# Patient Record
Sex: MALE | Race: BLACK OR AFRICAN AMERICAN | Employment: FULL TIME | URBAN - METROPOLITAN AREA
[De-identification: names, ages, dates, MRNs, and addresses within clinical notes are randomized per-mention and may not be internally consistent; named-entity substitution may affect disease eponyms.]

---

## 2018-07-25 ENCOUNTER — APPOINTMENT (OUTPATIENT)
Dept: GENERAL RADIOLOGY | Age: 38
End: 2018-07-25
Attending: EMERGENCY MEDICINE
Payer: COMMERCIAL

## 2018-07-25 ENCOUNTER — HOSPITAL ENCOUNTER (EMERGENCY)
Age: 38
Discharge: HOME | End: 2018-07-26
Attending: EMERGENCY MEDICINE
Payer: COMMERCIAL

## 2018-07-25 VITALS
WEIGHT: 166.6 LBS | SYSTOLIC BLOOD PRESSURE: 171 MMHG | TEMPERATURE: 98 F | DIASTOLIC BLOOD PRESSURE: 113 MMHG | HEART RATE: 84 BPM | OXYGEN SATURATION: 100 % | RESPIRATION RATE: 18 BRPM | BODY MASS INDEX: 26.09 KG/M2

## 2018-07-25 DIAGNOSIS — S39.012A BACK STRAIN, INITIAL ENCOUNTER: Primary | ICD-10-CM

## 2018-07-25 PROCEDURE — 96372 THER/PROPH/DIAG INJ SC/IM: CPT

## 2018-07-25 PROCEDURE — 74011250636 HC RX REV CODE- 250/636: Performed by: EMERGENCY MEDICINE

## 2018-07-25 PROCEDURE — 72072 X-RAY EXAM THORAC SPINE 3VWS: CPT

## 2018-07-25 PROCEDURE — 81001 URINALYSIS AUTO W/SCOPE: CPT | Performed by: EMERGENCY MEDICINE

## 2018-07-25 PROCEDURE — 99283 EMERGENCY DEPT VISIT LOW MDM: CPT

## 2018-07-25 PROCEDURE — 72100 X-RAY EXAM L-S SPINE 2/3 VWS: CPT

## 2018-07-25 PROCEDURE — 74011250637 HC RX REV CODE- 250/637: Performed by: EMERGENCY MEDICINE

## 2018-07-25 RX ORDER — KETOROLAC TROMETHAMINE 30 MG/ML
60 INJECTION, SOLUTION INTRAMUSCULAR; INTRAVENOUS
Status: COMPLETED | OUTPATIENT
Start: 2018-07-25 | End: 2018-07-25

## 2018-07-25 RX ORDER — CYCLOBENZAPRINE HCL 10 MG
10 TABLET ORAL
Status: COMPLETED | OUTPATIENT
Start: 2018-07-25 | End: 2018-07-25

## 2018-07-25 RX ADMIN — KETOROLAC TROMETHAMINE 60 MG: 30 INJECTION, SOLUTION INTRAMUSCULAR; INTRAVENOUS at 23:10

## 2018-07-25 RX ADMIN — CYCLOBENZAPRINE HYDROCHLORIDE 10 MG: 10 TABLET, FILM COATED ORAL at 23:10

## 2018-07-25 NOTE — LETTER
Catarino. Manny 57 
[address] [facility] [id number] 
[phone] Work/School Note Date: 7/25/2018 To Whom It May concern: 
 
Gilbert Stahl was seen and treated today in the emergency room by the following provider(s): 
Attending Provider: Darleen Flaherty MD.   
 
Gilbert Stahl may return to work on Saturday 7/28/18. Sincerely, Darleen Flaherty MD

## 2018-07-26 LAB
APPEARANCE UR: CLEAR
BACTERIA URNS QL MICRO: NEGATIVE /HPF
BILIRUB UR QL: NEGATIVE
COLOR UR: ABNORMAL
EPITH CASTS URNS QL MICRO: ABNORMAL /LPF
GLUCOSE UR STRIP.AUTO-MCNC: NEGATIVE MG/DL
HGB UR QL STRIP: ABNORMAL
KETONES UR QL STRIP.AUTO: NEGATIVE MG/DL
LEUKOCYTE ESTERASE UR QL STRIP.AUTO: NEGATIVE
NITRITE UR QL STRIP.AUTO: NEGATIVE
PH UR STRIP: 6 [PH] (ref 5–8)
PROT UR STRIP-MCNC: NEGATIVE MG/DL
RBC #/AREA URNS HPF: ABNORMAL /HPF (ref 0–5)
SP GR UR REFRACTOMETRY: <1.005 (ref 1–1.03)
UA: UC IF INDICATED,UAUC: ABNORMAL
UROBILINOGEN UR QL STRIP.AUTO: 0.2 EU/DL (ref 0.2–1)
WBC URNS QL MICRO: ABNORMAL /HPF (ref 0–4)

## 2018-07-26 RX ORDER — METHYLPREDNISOLONE 4 MG/1
TABLET ORAL
Qty: 1 DOSE PACK | Refills: 0 | Status: SHIPPED | OUTPATIENT
Start: 2018-07-26 | End: 2022-03-11

## 2018-07-26 RX ORDER — NAPROXEN 500 MG/1
500 TABLET ORAL 2 TIMES DAILY WITH MEALS
Qty: 30 TAB | Refills: 0 | Status: SHIPPED | OUTPATIENT
Start: 2018-07-26 | End: 2022-03-11

## 2018-07-26 RX ORDER — METHOCARBAMOL 500 MG/1
500 TABLET, FILM COATED ORAL 3 TIMES DAILY
Qty: 20 TAB | Refills: 0 | Status: SHIPPED | OUTPATIENT
Start: 2018-07-26 | End: 2022-03-11

## 2018-07-26 NOTE — ED PROVIDER NOTES
HPI Comments: 30 y.o. male who presents ambulatory to the ED accompanied by significant other, with chief complaint of left sided back pain that began today while at work. 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 Was at work today when he felt the onset of pain in the left side of the mid-to-lower back, but there was no definite injury or trauma associated with the onset of his discomfort. Pt states that his pain has been constant and progressively worsening since onset. He reports exacerbation of the pain with movement and with positional changes. He took a dose of diclofenac at home without significant relief. Pt ranks his current level of discomfort in the left side of his back as an 8/10 in severity. He denies any history of similar pain in the past. Pt specifically denies fevers, chills, congestion, cough, shortness of breath, chest pain, abdominal pain, nausea, vomiting, diarrhea, constipation, difficulty urinating, dysuria, incontinence, neck pain, skin rash, headache, or extremity numbness or weakness. There are no other acute medical concerns at this time. Social hx: Positive for Tobacco use (current some day smoker); Positive for EtOH use (occasional); Negative for Illicit Drug Abuse    PCP: None    Note written by Josefina Nugent, as dictated by Mariano Humphrey MD 10:43 PM     The history is provided by the patient and a significant other. No  was used. Past Medical History:   Diagnosis Date    Contact dermatitis and other eczema, due to unspecified cause        History reviewed. No pertinent surgical history. Family History:   Problem Relation Age of Onset    Diabetes Mother        Social History     Social History    Marital status: SINGLE     Spouse name: N/A    Number of children: N/A    Years of education: N/A     Occupational History    Not on file.      Social History Main Topics  Smoking status: Current Some Day Smoker    Smokeless tobacco: Never Used    Alcohol use Yes    Drug use: No    Sexual activity: Yes     Partners: Female     Other Topics Concern    Not on file     Social History Narrative         ALLERGIES: Pcn [penicillins]    Review of Systems   Constitutional: Negative for chills and fever. HENT: Negative for congestion. Respiratory: Negative for cough and shortness of breath. Cardiovascular: Negative for chest pain. Gastrointestinal: Negative for abdominal pain, constipation, diarrhea, nausea and vomiting. Genitourinary: Negative for difficulty urinating and dysuria. Musculoskeletal: Positive for back pain (left sided, mid-to-lower). Negative for neck pain. Skin: Negative for rash. Neurological: Negative for weakness, numbness and headaches. All other systems reviewed and are negative. Vitals:    07/25/18 2134   BP: (!) 171/113   Pulse: 84   Resp: 18   Temp: 98 °F (36.7 °C)   SpO2: 100%   Weight: 75.6 kg (166 lb 9.6 oz)            Physical Exam   Nursing note and vitals reviewed. CONSTITUTIONAL: Well-appearing; well-nourished; in no apparent distress  HEAD: Normocephalic; atraumatic  EYES: PERRL; EOM intact; conjunctiva and sclera are clear bilaterally. ENT: No rhinorrhea; normal pharynx with no tonsillar hypertrophy; mucous membranes pink/moist, no erythema, no exudate. NECK: Supple; non-tender; no cervical lymphadenopathy  CARD: Normal S1, S2; no murmurs, rubs, or gallops. Regular rate and rhythm. RESP: Normal respiratory effort; breath sounds clear and equal bilaterally; no wheezes, rhonchi, or rales. ABD: Normal bowel sounds; non-distended; non-tender; no palpable organomegaly, no masses, no bruits. Back Exam: Normal inspection; no vertebral point tenderness, no CVA tenderness. Normal range of motion.   EXT: Normal ROM in all four extremities; non-tender to palpation; no swelling or deformity; distal pulses are normal, no edema.  SKIN: Warm; dry; no rash. NEURO:Alert and oriented x 3, coherent, VARSHA-XII grossly intact, sensory and motor are non-focal.        MDM  Number of Diagnoses or Management Options  Back strain, initial encounter:   Diagnosis management comments: Assessment: acute exacerbation of low back pain, rule out vertebral spine disease. The patient is hemodynamically stable      Plan: x-ray of the lumbar spine and sacrum/ analgesia/ sitting on exam/ Monitor and Reevaluate. Amount and/or Complexity of Data Reviewed  Clinical lab tests: ordered and reviewed  Tests in the radiology section of CPT®: ordered and reviewed  Tests in the medicine section of CPT®: reviewed and ordered  Discussion of test results with the performing providers: yes  Decide to obtain previous medical records or to obtain history from someone other than the patient: yes  Obtain history from someone other than the patient: yes  Review and summarize past medical records: yes  Discuss the patient with other providers: yes  Independent visualization of images, tracings, or specimens: yes          ED Course       Procedures    XRAY INTERPRETATION (ED MD)  Xray of Lumbar spine shows no fracture. No subluxation/dislocation. No bony abnormality. Donato Holstein, MD 10:44 PM      XRAY INTERPRETATION (ED MD)  Xray of Thoracic spine shows no fracture. No subluxation/dislocation. No bony abnormality. Donato Holstein, MD 10:44 PM    Progress Note:   Pt has been reexamined by Donato Holstein, MD. Pt is feeling much better. Symptoms have improved. All available results have been reviewed with pt and any available family. Pt understands sx, dx, and tx in ED. Care plan has been outlined and questions have been answered. Pt is ready to go home. Will send home on low back pain instructions. Prescription of Medrol Dosepak, Robaxin, and naproxen. Outpatient referral with PCP as needed. Written by Donato Holstein, MD,9:49 AM    .   .

## 2018-07-26 NOTE — DISCHARGE INSTRUCTIONS

## 2018-07-26 NOTE — ED NOTES
Bedside and Verbal shift change report given to Amelia Valdivia (oncoming nurse) by Mami Reyes (offgoing nurse). Report included the following information SBAR, ED Summary and MAR.

## 2022-01-31 ENCOUNTER — NURSE TRIAGE (OUTPATIENT)
Dept: OTHER | Facility: CLINIC | Age: 42
End: 2022-01-31

## 2022-01-31 NOTE — TELEPHONE ENCOUNTER
Received call from Nav Lyman at [facility] with [employer] Complaint; Patient with Red Flag Complaint requesting to establish care with Dr. Mignon Garcia: Caller states \"Hernia\"     Current Symptoms: Bump on scrotum for a few years. Denies urination issues. Starting to become painful    Onset: a few years ago; worsening    Associated Symptoms: NA    Pain Severity: 8/10; aching and sharp; intermittent    Temperature: denies fever    What has been tried: Alleve    LMP: NA Pregnant: NA    Recommended disposition: PCP within 24 hours. ED/UCC as backup plan    Care advice provided, patient verbalizes understanding; denies any other questions or concerns; instructed to call back for any new or worsening symptoms. Writer provided warm transfer to [facility] for appointment scheduling    Attention Provider: Thank you for allowing me to participate in the care of your patient. The patient was connected to triage in response to information provided to the [facility]. Please do not respond through this encounter as the response is not directed to a shared pool.       Reason for Disposition   [1] Pain comes and goes (intermittent) AND [2] present > 24 hours    Protocols used: SCROTAL PAIN-ADULT-

## 2022-03-11 ENCOUNTER — OFFICE VISIT (OUTPATIENT)
Dept: INTERNAL MEDICINE CLINIC | Age: 42
End: 2022-03-11
Payer: COMMERCIAL

## 2022-03-11 ENCOUNTER — HOSPITAL ENCOUNTER (OUTPATIENT)
Dept: GENERAL RADIOLOGY | Age: 42
Discharge: HOME | End: 2022-03-11
Payer: COMMERCIAL

## 2022-03-11 VITALS
OXYGEN SATURATION: 99 % | DIASTOLIC BLOOD PRESSURE: 76 MMHG | HEIGHT: 67 IN | TEMPERATURE: 98.4 F | HEART RATE: 91 BPM | BODY MASS INDEX: 26.68 KG/M2 | SYSTOLIC BLOOD PRESSURE: 135 MMHG | WEIGHT: 170 LBS

## 2022-03-11 DIAGNOSIS — M25.551 RIGHT HIP PAIN: ICD-10-CM

## 2022-03-11 DIAGNOSIS — Z00.00 ENCOUNTER FOR MEDICAL EXAMINATION TO ESTABLISH CARE: Primary | ICD-10-CM

## 2022-03-11 DIAGNOSIS — N48.89 EPIDERMOID CYST OF SKIN OF PENIS: ICD-10-CM

## 2022-03-11 DIAGNOSIS — Z11.59 NEED FOR HEPATITIS C SCREENING TEST: ICD-10-CM

## 2022-03-11 DIAGNOSIS — Z87.39 HISTORY OF GOUT: ICD-10-CM

## 2022-03-11 DIAGNOSIS — E55.9 VITAMIN D DEFICIENCY: ICD-10-CM

## 2022-03-11 PROCEDURE — 73502 X-RAY EXAM HIP UNI 2-3 VIEWS: CPT

## 2022-03-11 PROCEDURE — 99203 OFFICE O/P NEW LOW 30 MIN: CPT | Performed by: INTERNAL MEDICINE

## 2022-03-11 RX ORDER — CHOLECALCIFEROL (VITAMIN D3) 125 MCG
CAPSULE ORAL
COMMUNITY
End: 2022-04-22

## 2022-03-11 NOTE — PROGRESS NOTES
Called and notified patient. Showed degenerative changes and necrosis. Deanna Lombardi  referred to [facility]

## 2022-03-11 NOTE — PROGRESS NOTES
Health Maintenance Due   Topic Date Due    Hepatitis C Screening  Never done    Depression Screen  Never done    COVID-19 Vaccine (1) Never done    Pneumococcal 0-64 years (1 of 2 - PPSV23) Never done    DTaP/Tdap/Td series (1 - Tdap) Never done    Flu Vaccine (1) Never done       Chief Complaint   Patient presents with    New Patient    Hip Pain    Testicle Pain       1. Have you been to the ER, urgent care clinic since your last visit? Hospitalized since your last visit? Yes, Swollen toe ( Gout), urgent care, 12/2021    2. Have you seen or consulted any other health care providers outside of the [address] [place] [address] since your last visit? Include any pap smears or colon screening. No    3) Do you have an Advance Directive on file? no    4) Are you interested in receiving information on Advance Directives? NO      Patient is accompanied by self I have received verbal consent from Stephenie Reddy to discuss any/all medical information while they are present in the room.   3

## 2022-03-11 NOTE — PROGRESS NOTES
HISTORY OF PRESENT ILLNESS  Von Trujillo is a 46 y.o. male. Patient was seen to establish care. Reports that he was been seen in the ER or urgent care for what he was told was gout and/or tendinitis. Reports this has not happen in some time. Reports ongoing right hip pain for years now. Also had some lower back pain at one time too. Reports that the pain is achy. Can travel downward. Takes aleve and this does help. Has been having to take this often recently. No recent trauma. No falls. Notices that it happens more when sitting for long periods or doing manual work. Also describes an area to the penis that has been there for years. Not painful and has not changes after all these years. Non bothersome and no signs of infection. does not impede  symptoms. Visit Vitals  BP (!) 144/86 (BP 1 Location: Left upper arm, BP Patient Position: Sitting, BP Cuff Size: Adult)   Pulse 91   Temp 98.4 °F (36.9 °C) (Axillary)   Ht 5' 7\" (1.702 m)   Wt 170 lb (77.1 kg)   SpO2 99%   BMI 26.63 kg/m²     Past Medical History:   Diagnosis Date    Contact dermatitis and other eczema, due to unspecified cause      History reviewed. No pertinent surgical history. Family History   Problem Relation Age of Onset    Diabetes Mother     Hypertension Father     Hypertension Brother      Outpatient Encounter Medications as of 3/11/2022   Medication Sig Dispense Refill    naproxen sodium (Aleve) 220 mg cap Take  by mouth.  [DISCONTINUED] methylPREDNISolone (MEDROL, ODESSA,) 4 mg tablet Use as directed (Patient not taking: Reported on 3/11/2022) 1 Dose Pack 0    [DISCONTINUED] naproxen (NAPROSYN) 500 mg tablet Take 1 Tab by mouth two (2) times daily (with meals). (Patient not taking: Reported on 3/11/2022) 30 Tab 0    [DISCONTINUED] methocarbamol (ROBAXIN) 500 mg tablet Take 1 Tab by mouth three (3) times daily.  (Patient not taking: Reported on 3/11/2022) 20 Tab 0    [DISCONTINUED] hydrocortisone (WESTCORT) 0.2 % topical cream Apply  to affected area two (2) times a day. use thin layer (Patient not taking: Reported on 3/11/2022) 15 g 1    [DISCONTINUED] triamcinolone acetonide (KENALOG) 0.1 % topical cream Apply  to affected area two (2) times a day. use thin layer  (Patient not taking: Reported on 3/11/2022)       No facility-administered encounter medications on file as of 3/11/2022. HPI    Review of Systems   Constitutional: Negative. Respiratory: Negative. Cardiovascular: Negative. Gastrointestinal: Negative. Genitourinary: Negative for dysuria, flank pain and frequency. Area to penis    Musculoskeletal: Positive for back pain and joint pain. Neurological: Negative. Psychiatric/Behavioral: Negative. Physical Exam  Vitals and nursing note reviewed. Exam conducted with a chaperone present. Cardiovascular:      Rate and Rhythm: Normal rate and regular rhythm. Pulmonary:      Effort: Pulmonary effort is normal.      Breath sounds: Normal breath sounds. Abdominal:      Palpations: Abdomen is soft. Genitourinary:     Pubic Area: No rash. Penis: No erythema, tenderness or discharge. Comments: Pea size cyst. Fluid filled. No signs of inflammation   Musculoskeletal:      Comments: Pain when extended right leg   Neurological:      Mental Status: He is alert and oriented to person, place, and time. Psychiatric:         Behavior: Behavior normal.         ASSESSMENT and PLAN  Diagnoses and all orders for this visit:    1. Encounter for medical examination to establish care  -     CBC WITH AUTOMATED DIFF; Future  -     METABOLIC PANEL, COMPREHENSIVE; Future  -     LIPID PANEL; Future    2. History of gout    3. Right hip pain  -     XR HIP RT W OR WO PELV 2-3 VWS; Future    4. Vitamin D deficiency  -     VITAMIN D, 25 HYDROXY; Future    5. Need for hepatitis C screening test  -     HEPATITIS C AB; Future    6. Epidermoid cyst of skin of penis        -     Monitor.  Offered to see specialist     Follow-up and Dispositions    · Return in 1 year (on 3/11/2023), or if symptoms worsen or fail to improve.       lab results and schedule of future lab studies reviewed with patient  reviewed diet, exercise and weight control  cardiovascular risk and specific lipid/LDL goals reviewed  reviewed medications and side effects in detail

## 2022-03-12 LAB
25(OH)D3+25(OH)D2 SERPL-MCNC: 11.1 NG/ML (ref 30–100)
ALBUMIN SERPL-MCNC: 4.6 G/DL (ref 4–5)
ALBUMIN/GLOB SERPL: 1.5 {RATIO} (ref 1.2–2.2)
ALP SERPL-CCNC: 60 IU/L (ref 44–121)
ALT SERPL-CCNC: 19 IU/L (ref 0–44)
AST SERPL-CCNC: 20 IU/L (ref 0–40)
BASOPHILS # BLD AUTO: 0.1 X10E3/UL (ref 0–0.2)
BASOPHILS NFR BLD AUTO: 1 %
BILIRUB SERPL-MCNC: <0.2 MG/DL (ref 0–1.2)
BUN SERPL-MCNC: 17 MG/DL (ref 6–20)
BUN/CREAT SERPL: 14 (ref 9–20)
CALCIUM SERPL-MCNC: 9.8 MG/DL (ref 8.7–10.2)
CHLORIDE SERPL-SCNC: 104 MMOL/L (ref 96–106)
CHOLEST SERPL-MCNC: 178 MG/DL (ref 100–199)
CO2 SERPL-SCNC: 21 MMOL/L (ref 20–29)
CREAT SERPL-MCNC: 1.2 MG/DL (ref 0.76–1.27)
EGFR: 79 ML/MIN/1.73
EOSINOPHIL # BLD AUTO: 0.4 X10E3/UL (ref 0–0.4)
EOSINOPHIL NFR BLD AUTO: 6 %
ERYTHROCYTE [DISTWIDTH] IN BLOOD BY AUTOMATED COUNT: 19.3 % (ref 11.6–15.4)
GLOBULIN SER CALC-MCNC: 3.1 G/DL (ref 1.5–4.5)
GLUCOSE SERPL-MCNC: 102 MG/DL (ref 65–99)
HCT VFR BLD AUTO: 35.7 % (ref 37.5–51)
HCV AB S/CO SERPL IA: <0.1 S/CO RATIO (ref 0–0.9)
HDLC SERPL-MCNC: 36 MG/DL
HGB BLD-MCNC: 10 G/DL (ref 13–17.7)
IMM GRANULOCYTES # BLD AUTO: 0 X10E3/UL (ref 0–0.1)
IMM GRANULOCYTES NFR BLD AUTO: 0 %
IMP & REVIEW OF LAB RESULTS: NORMAL
INTERPRETATION: NORMAL
LDLC SERPL CALC-MCNC: 99 MG/DL (ref 0–99)
LYMPHOCYTES # BLD AUTO: 1.8 X10E3/UL (ref 0.7–3.1)
LYMPHOCYTES NFR BLD AUTO: 32 %
MCH RBC QN AUTO: 19.2 PG (ref 26.6–33)
MCHC RBC AUTO-ENTMCNC: 28 G/DL (ref 31.5–35.7)
MCV RBC AUTO: 68 FL (ref 79–97)
MONOCYTES # BLD AUTO: 0.6 X10E3/UL (ref 0.1–0.9)
MONOCYTES NFR BLD AUTO: 11 %
NEUTROPHILS # BLD AUTO: 2.8 X10E3/UL (ref 1.4–7)
NEUTROPHILS NFR BLD AUTO: 50 %
PLATELET # BLD AUTO: 325 X10E3/UL (ref 150–450)
POTASSIUM SERPL-SCNC: 4.2 MMOL/L (ref 3.5–5.2)
PROT SERPL-MCNC: 7.7 G/DL (ref 6–8.5)
RBC # BLD AUTO: 5.22 X10E6/UL (ref 4.14–5.8)
SODIUM SERPL-SCNC: 141 MMOL/L (ref 134–144)
TRIGL SERPL-MCNC: 252 MG/DL (ref 0–149)
VLDLC SERPL CALC-MCNC: 43 MG/DL (ref 5–40)
WBC # BLD AUTO: 5.6 X10E3/UL (ref 3.4–10.8)

## 2022-03-12 NOTE — PROGRESS NOTES
Hbg. Can we add an iron and ferritin please   Lipids are abnormal. Watch the saturated fats, red meats and processed foods   Vitamin d low. Will send in replacement for 12 weeks and then take OTC daily.  Please send in this for me as he does not have a pharmacy on file

## 2022-03-19 PROBLEM — Z87.39 HISTORY OF GOUT: Status: ACTIVE | Noted: 2022-03-11

## 2022-03-21 ENCOUNTER — OFFICE VISIT (OUTPATIENT)
Dept: ORTHOPEDIC SURGERY | Age: 42
End: 2022-03-21
Payer: COMMERCIAL

## 2022-03-21 VITALS — BODY MASS INDEX: 25.9 KG/M2 | HEIGHT: 67 IN | WEIGHT: 165 LBS

## 2022-03-21 DIAGNOSIS — M87.051 AVASCULAR NECROSIS OF BONE OF RIGHT HIP (HCC): Primary | ICD-10-CM

## 2022-03-21 PROCEDURE — 99205 OFFICE O/P NEW HI 60 MIN: CPT | Performed by: ORTHOPAEDIC SURGERY

## 2022-03-21 NOTE — PROGRESS NOTES
Marco Antonio Chauhan (: 1980) is a 46 y.o. male patient, here for evaluation of the following chief complaint(s):  Hip Pain (right hip)       ASSESSMENT/PLAN:  Below is the assessment and plan developed based on review of pertinent history, physical exam, labs, studies, and medications. 42-year-old male comes in today with bilateral severe hip pain right worse than left. His x-rays reveal significant avascular necrosis in the weightbearing zone of both femoral heads with significant destruction and subchondral collapse with fracture. We had a discussion regarding her options. He has had this for more than a year with failure of conservative treatment including activity modification oral medication, therapy. We discussed total hip arthroplasty as an outpatient. Risks and benefits of the surgery discussed with the patient. He understands and wishes to proceed      1. Avascular necrosis of bone of right hip [facility])      Encounter Diagnosis   Name Primary?  Avascular necrosis of bone of right hip (HCC) Yes        No follow-ups on file. SUBJECTIVE/OBJECTIVE:  Marco Antonio Chauhan (: 1980) is a 46 y.o. male who presents today for the following:  Chief Complaint   Patient presents with    Hip Pain     right hip       42-year-old male comes in today complaining of bilateral hip pain right worse than left. The pain is in his groin and thigh. He says it is moderate to severe. He can only walk 2 blocks. He does not use a cane but has a significant limp bilaterally. He has difficulty putting on his shoes. He struggles going up and down stairs. He has had more than a year of conservative treatment without improvement. He says it is worsening    IMAGING:  XR Results (most recent):  Results from Hospital Encounter encounter on 22    XR HIP RT W OR WO PELV 2-3 VWS    Narrative  EXAM: XR HIP RT W OR WO PELV 2-3 VWS    INDICATION: Right hip pain. COMPARISON: None.     FINDINGS: AP view of the pelvis and a frogleg lateral view of the right hip  demonstrate no fracture, dislocation or other acute abnormality. Significant  degenerative changes are seen in the hip joints bilaterally with collapse of the  femoral heads compatible with avascular necrosis. Impression  Bilateral hip degenerative changes and avascular necrosis. No acute abnormality. Allergies   Allergen Reactions    Pcn [Penicillins] Unknown (comments)       Current Outpatient Medications   Medication Sig    naproxen sodium (Aleve) 220 mg cap Take  by mouth. No current facility-administered medications for this visit. Past Medical History:   Diagnosis Date    Contact dermatitis and other eczema, due to unspecified cause         No past surgical history on file. Family History   Problem Relation Age of Onset    Diabetes Mother     Hypertension Father     Hypertension Brother         Social History     Tobacco Use    Smoking status: Never Smoker    Smokeless tobacco: Never Used   Substance Use Topics    Alcohol use: Yes        All systems reviewed x 12 and were negative with the exception of None      No flowsheet data found. Vitals:  Ht 5' 7\" (1.702 m)   Wt 165 lb (74.8 kg)   BMI 25.84 kg/m²    Body mass index is 25.84 kg/m². Physical Exam    General: NAD, well developed, well nourished. Cardiac: Extremities well perfused. Respiratory: Nonlabored breathing. RLE: Obvious antalgic gait.  stinchfield. 0-100 degrees of flexion. >20 degrees internal rotation, >30 degrees external rotation. Negative ASHLYN. Severe pain with flexion adduction and internal rotation. .  Motor strength grossly intact. LLE: Obvious antalgic gait.  stinchfield. 0-100 degrees of flexion. >20 degrees internal rotation, >30 degrees external rotation. Negative ASHLYN. Severe pain with flexion adduction and internal rotation. .  Motor strength grossly intact. Skin: Warm well perfused.       Vascular: Palpable pedal pulses bilaterally. Equal. Capillary refill less than 2 seconds. An electronic signature was used to authenticate this note.   -- July Gomez MD

## 2022-03-23 DIAGNOSIS — M87.051 AVASCULAR NECROSIS OF BONE OF RIGHT HIP (HCC): Primary | ICD-10-CM

## 2022-03-24 DIAGNOSIS — Z96.641 STATUS POST RIGHT HIP REPLACEMENT: Primary | ICD-10-CM

## 2022-03-31 ENCOUNTER — OFFICE VISIT (OUTPATIENT)
Dept: INTERNAL MEDICINE CLINIC | Age: 42
End: 2022-03-31
Payer: COMMERCIAL

## 2022-03-31 VITALS
BODY MASS INDEX: 26.37 KG/M2 | OXYGEN SATURATION: 99 % | WEIGHT: 168 LBS | DIASTOLIC BLOOD PRESSURE: 72 MMHG | SYSTOLIC BLOOD PRESSURE: 138 MMHG | TEMPERATURE: 97.8 F | RESPIRATION RATE: 16 BRPM | HEIGHT: 67 IN | HEART RATE: 86 BPM

## 2022-03-31 DIAGNOSIS — M25.551 RIGHT HIP PAIN: ICD-10-CM

## 2022-03-31 DIAGNOSIS — Z87.39 HISTORY OF GOUT: ICD-10-CM

## 2022-03-31 DIAGNOSIS — E55.9 VITAMIN D DEFICIENCY: ICD-10-CM

## 2022-03-31 DIAGNOSIS — Z01.818 PRE-OP EVALUATION: Primary | ICD-10-CM

## 2022-03-31 PROCEDURE — 99214 OFFICE O/P EST MOD 30 MIN: CPT | Performed by: INTERNAL MEDICINE

## 2022-03-31 NOTE — PROGRESS NOTES
ADVISED PATIENT OF THE FOLLOWING HEALTH MAINTAINCE DUE  Health Maintenance Due   Topic Date Due    COVID-19 Vaccine (1) Never done    DTaP/Tdap/Td series (1 - Tdap) Never done    Flu Vaccine (1) Never done      Chief Complaint   Patient presents with    Pre-op Exam     hip surgery        1. Have you been to the ER, urgent care clinic since your last visit? Hospitalized since your last visit? No    2. Have you seen or consulted any other health care providers outside of the [address] [place] [address] since your last visit? Include any DEXA scan, mammography  or colon screening. no    3. Do you have an Advance Directive on file? no    4. Do you have a DNR on file? no    Patient is accompanied by self I have received verbal consent from Adrian Beaulieu to discuss any/all medical information while they are present in the room. No flowsheet data found.       [id number], [address] [place] [address]  [address] [facility] [address] [address]  Phone: [phone] Fax: [phone]

## 2022-03-31 NOTE — PROGRESS NOTES
HISTORY OF PRESENT ILLNESS  Melissa Kearns is a 46 y.o. male. Patient was seen for pre op clearance. Right hip xray did show DDD and avascular necrosis. Patient is to have surgery next month. Reports pain at a 4 today. Is taking naproxen PRN. No recent falls. No known history with anesthesia concerns. Allergies reviewed. Labs were stable   Visit Vitals  /72 (BP 1 Location: Right arm, BP Patient Position: Sitting)   Pulse 86   Temp 97.8 °F (36.6 °C) (Oral)   Resp 16   Ht 5' 7\" (1.702 m)   Wt 168 lb (76.2 kg)   SpO2 99%   BMI 26.31 kg/m²     Past Medical History:   Diagnosis Date    Contact dermatitis and other eczema, due to unspecified cause      History reviewed. No pertinent surgical history. Family History   Problem Relation Age of Onset    Diabetes Mother     Hypertension Father     Hypertension Brother      Outpatient Encounter Medications as of 3/31/2022   Medication Sig Dispense Refill    naproxen sodium (Aleve) 220 mg cap Take  by mouth. No facility-administered encounter medications on file as of 3/31/2022. HPI    Review of Systems   Constitutional: Negative. Respiratory: Negative. Cardiovascular: Negative. Musculoskeletal: Positive for joint pain. Neurological: Negative. Physical Exam  Vitals and nursing note reviewed. Cardiovascular:      Rate and Rhythm: Normal rate and regular rhythm. Pulses: Normal pulses. Heart sounds: Normal heart sounds. Pulmonary:      Effort: Pulmonary effort is normal.      Breath sounds: Normal breath sounds. Abdominal:      Palpations: Abdomen is soft. Musculoskeletal:      Right hip: Tenderness present. Normal strength. Left hip: Normal.        Legs:    Skin:     General: Skin is warm. Neurological:      Mental Status: He is alert and oriented to person, place, and time. Psychiatric:         Behavior: Behavior normal.         ASSESSMENT and PLAN  Diagnoses and all orders for this visit:    1.  Pre-op evaluation  - patient cleared for surgery. Patient told to stop NSAID 5 days prior to surgery and do tylenol   2. Right hip pain    3. History of gout    4.  Vitamin D deficiency      Follow-up and Dispositions    · Return if symptoms worsen or fail to improve.       lab results and schedule of future lab studies reviewed with patient  reviewed diet, exercise and weight control  reviewed medications and side effects in detail

## 2022-04-14 ENCOUNTER — OFFICE VISIT (OUTPATIENT)
Dept: ORTHOPEDIC SURGERY | Age: 42
End: 2022-04-14
Payer: COMMERCIAL

## 2022-04-14 DIAGNOSIS — M87.051 AVASCULAR NECROSIS OF BONE OF RIGHT HIP (HCC): Primary | ICD-10-CM

## 2022-04-14 DIAGNOSIS — M25.551 RIGHT HIP PAIN: ICD-10-CM

## 2022-04-14 DIAGNOSIS — R26.2 DIFFICULTY WALKING: ICD-10-CM

## 2022-04-14 PROCEDURE — 97161 PT EVAL LOW COMPLEX 20 MIN: CPT | Performed by: PHYSICAL THERAPIST

## 2022-04-14 PROCEDURE — 97530 THERAPEUTIC ACTIVITIES: CPT | Performed by: PHYSICAL THERAPIST

## 2022-04-14 NOTE — PROGRESS NOTES
Patient Name: Tobi Iniguez  Date:2022  : 1980  [x]  Patient  Verified  Payor: BLUE CROSS / Plan: [facility] PPO / Product Type: PPO /      Total Treatment Time (min): 30  Total Timed Codes (min): 30  Visit #: 1     Referring Provider: Merary Dubois MD  Facility  Preop evaluation right hip    Subjective:    Patient is a 26-year-old gentleman referred to physical therapy by Dr. Mariya Gutierrez. He is referred for preoperative evaluation for an impending right total hip replacement. Patient has severe avascular necrosis of the weightbearing zones of both the femoral head as well as significant subchondral collapse with fracture. He has had more than a year of right hip pain with failed conservative treatment. He has significant restriction with his vocation as well as recreational and social ADLs. He has an impending surgery for next Friday for a total hip replacement to be completed on outpatient basis. Objective:    Gait: antalgic gait pattern noted on the right lower extremity has decreased stance time as well as lateral trunk shift for maintaining balance. Balance: Single-leg stance reveals less than 2 seconds. He is noted to have femoral adduction and internal rotation with gluteus medius weakness. Range of motion right hip: Flexion 95 degrees extension neutral abduction 20 degrees abduction neutral  Strength: Quadriceps strength: 4/5 . Hip abduction: 3/5       HIP Flexion:   4/5    Soft tissue: restriction is noted of the quadriceps, rectus, hamstrings and IT band musculature. Neuro Exam: intact    Treatment:  Low complexity evaluation 20 min  Therapeutic activity direct instruction 10 min   The patient's mobility, range of motion and function was assessed today for postoperative training and instruction.    They were instructed in functional gait utilizing a standard rolling walker as well as axillary crutches  Stair training was completed with hand-held assist and rail. Patient was instructed in and completed functional exercises for postoperative quadriceps activation,DVT prophylaxis and swelling control, including elevation and the use of cryotherapy. We discussed transfers to and from a car, sit to/form supine,     Written and pictorial exercises and care instructions were provided to the patient. Assessment:  Patient presents with impairments related to gait, range of motion, quad strength, balance, impaired ability to ambulate, negotiate stairs, perform ADLs and participate in desired activities second to right hip OA and avascular necrosis. Outpatient total hip arthroplasty is scheduled for April 22. He will benefit from PT to address above limitations and maximize function  When he returns home prior to the onset of home health therapy. Goals- 1 visit  1. Patient will demonstrate compliance with home exercise program.  2. Patient to demonstrate [place] with gait using device walker/crutches  3. Patient to demonstrate independence with stairs with hand held assistance and rail    Plan:  Patient to receive home health PT post operatively day 13 for 2-4 weeks and then return to the clinic for outpatient Physical Therapy. Maria G Florian, PT    4/14/2022      The referring physician has reviewed and approved this evaluation and plan of care as noted by the electronic signature attached to note.

## 2022-04-22 DIAGNOSIS — Z98.890 STATUS POST HIP SURGERY: Primary | ICD-10-CM

## 2022-04-22 RX ORDER — OXYCODONE HYDROCHLORIDE 5 MG/1
5 TABLET ORAL
Qty: 42 TABLET | Refills: 0 | Status: SHIPPED | OUTPATIENT
Start: 2022-04-22 | End: 2022-05-02

## 2022-04-22 RX ORDER — NALOXONE HYDROCHLORIDE 4 MG/.1ML
SPRAY NASAL
Qty: 1 EACH | Refills: 0 | Status: SHIPPED | OUTPATIENT
Start: 2022-04-22 | End: 2022-05-20 | Stop reason: ALTCHOICE

## 2022-04-22 RX ORDER — TRAMADOL HYDROCHLORIDE 50 MG/1
50 TABLET ORAL
Qty: 42 TABLET | Refills: 0 | Status: SHIPPED | OUTPATIENT
Start: 2022-04-22 | End: 2022-05-07

## 2022-04-22 RX ORDER — MELOXICAM 7.5 MG/1
7.5 TABLET ORAL DAILY
Qty: 30 TABLET | Refills: 1 | Status: SHIPPED | OUTPATIENT
Start: 2022-04-22 | End: 2022-05-20

## 2022-04-22 RX ORDER — FAMOTIDINE 20 MG/1
20 TABLET, FILM COATED ORAL 2 TIMES DAILY
Qty: 60 TABLET | Refills: 0 | Status: SHIPPED | OUTPATIENT
Start: 2022-04-22 | End: 2022-05-20 | Stop reason: ALTCHOICE

## 2022-04-22 RX ORDER — GUAIFENESIN 100 MG/5ML
81 LIQUID (ML) ORAL 2 TIMES DAILY
Qty: 60 TABLET | Refills: 0 | Status: SHIPPED | OUTPATIENT
Start: 2022-04-22 | End: 2022-05-20 | Stop reason: ALTCHOICE

## 2022-04-29 ENCOUNTER — DOCUMENTATION ONLY (OUTPATIENT)
Dept: INTERNAL MEDICINE CLINIC | Age: 42
End: 2022-04-29

## 2022-04-29 NOTE — PROGRESS NOTES
[facility] states pt not scheduled for home health today 4/29/22. No BP reading today. Nurse will call if readings are high at next visit.  Nurse advised pt prescribed low dose amlodipine sent to pharmacy

## 2022-05-04 ENCOUNTER — DOCUMENTATION ONLY (OUTPATIENT)
Dept: ORTHOPEDIC SURGERY | Age: 42
End: 2022-05-04

## 2022-05-20 ENCOUNTER — OFFICE VISIT (OUTPATIENT)
Dept: ORTHOPEDIC SURGERY | Age: 42
End: 2022-05-20
Payer: COMMERCIAL

## 2022-05-20 VITALS — HEIGHT: 67 IN | WEIGHT: 168 LBS | BODY MASS INDEX: 26.37 KG/M2

## 2022-05-20 DIAGNOSIS — M16.12 ARTHRITIS OF LEFT HIP: ICD-10-CM

## 2022-05-20 DIAGNOSIS — Z96.641 STATUS POST RIGHT HIP REPLACEMENT: Primary | ICD-10-CM

## 2022-05-20 PROCEDURE — 99213 OFFICE O/P EST LOW 20 MIN: CPT | Performed by: PHYSICIAN ASSISTANT

## 2022-05-20 RX ORDER — MELOXICAM 15 MG/1
15 TABLET ORAL DAILY
Qty: 30 TABLET | Refills: 0 | Status: SHIPPED | OUTPATIENT
Start: 2022-05-20

## 2022-05-20 NOTE — PROGRESS NOTES
Mallorie Montana (: 1980) is a 46 y.o. male patient, here for evaluation of the following chief complaint(s):  Surgical Follow-up (right hip follow up)       ASSESSMENT/PLAN:  Below is the assessment and plan developed based on review of pertinent history, physical exam, labs, studies, and medications. Radiographs reviewed including 2 views of the right hip. Status post hip arthroplasty. No evidence of aseptic loosening. Leg lengths and offset are appropriate. Status post right hip arthroplasty 2022. The patient is doing well and they are happy with progress. Pain controlled. He also has severe AVN of his left hip. Ultimately he would like to have this hip done as well. Plans to monitor how he does over the next couple weeks and will call to schedule at his earliest convenience. Risks and benefits of joint arthroplasty discussed at length including but not limited to bleeding, need for blood transfusion, infection, damage to surrounding structures, intraoperative fracture, blood clots, pulmonary embolism, death. The patient understands the risks of surgery. All questions answered. We elected to move forward at his earliest convenience. This can be done outpatient. Follow-up in 6 weeks for standard postop check or sooner as needed. 1. Status post right hip replacement  -     XR HIP RT W OR WO PELV 2-3 VWS; Future  -     meloxicam (MOBIC) 15 mg tablet; Take 1 Tablet by mouth daily. , Normal, Disp-30 Tablet, R-0  -     REFERRAL TO PHYSICAL THERAPY  2. Arthritis of left hip      Encounter Diagnoses   Name Primary?  Status post right hip replacement Yes    Arthritis of left hip         No follow-ups on file. SUBJECTIVE/OBJECTIVE:  Mallorie Montana (: 1980) is a 46 y.o. male who presents today for the following:  Chief Complaint   Patient presents with    Surgical Follow-up     right hip follow up       24-year-old male comes in today for follow-up.   He status post a right total hip replacement on 4/22/2022. Postoperatively he is doing well. Incision healing well. Walking without a cane or walker. Has completed in-home physical therapy and has already transition to outpatient physical therapy. Doing well. Still has intermittent pains in his left hip, bothersome daily. Would like to move forward with a left total hip replacement however wants to fully heal from the right side first.    IMAGING:  XR Results (most recent):  Results from Appointment encounter on 05/20/22    XR HIP RT W OR WO PELV 2-3 VWS    Narrative  Radiographs reviewed including 2 views of the right hip. Status post hip arthroplasty. No evidence of aseptic loosening. Leg lengths and offset are appropriate. Allergies   Allergen Reactions    Pcn [Penicillins] Unknown (comments)       Current Outpatient Medications   Medication Sig    meloxicam (MOBIC) 15 mg tablet Take 1 Tablet by mouth daily.  amLODIPine (NORVASC) 5 mg tablet Take 1 Tablet by mouth daily. No current facility-administered medications for this visit. Past Medical History:   Diagnosis Date    Contact dermatitis and other eczema, due to unspecified cause         No past surgical history on file. Family History   Problem Relation Age of Onset    Diabetes Mother     Hypertension Father     Hypertension Brother         Social History     Tobacco Use    Smoking status: Never Smoker    Smokeless tobacco: Never Used   Substance Use Topics    Alcohol use: Yes        All systems reviewed x 12 and were negative with the exception of None      No flowsheet data found. Vitals:  Ht 5' 7\" (1.702 m)   Wt 168 lb (76.2 kg)   BMI 26.31 kg/m²    Body mass index is 26.31 kg/m². Physical Exam    General: NAD    Cardiac: Extremities well perfused. Respiratory: Nonlabored breathing. RLE: Incision clean dry and intact with no erythema. Minimal numbness over the LFCN. Normal gait and station.  Negative asad. No pain with gentle internal and external rotation. .  Motor strength is grossly intact. Skin warm well perfused. Capillary refill <2 sec. Basilio Crawford M.D. was available for immediate consultation as the supervising physician. An electronic signature was used to authenticate this note.   -- Carmen Doe PA-C

## 2022-05-21 DIAGNOSIS — I10 ESSENTIAL HYPERTENSION: ICD-10-CM

## 2022-05-21 RX ORDER — AMLODIPINE BESYLATE 5 MG/1
TABLET ORAL
Qty: 30 TABLET | Refills: 1 | Status: SHIPPED | OUTPATIENT
Start: 2022-05-21 | End: 2022-07-29 | Stop reason: SDUPTHER

## 2022-05-21 RX ORDER — AMLODIPINE BESYLATE 5 MG/1
10 TABLET ORAL DAILY
Qty: 60 TABLET | Refills: 0 | Status: SHIPPED | OUTPATIENT
Start: 2022-05-21 | End: 2022-05-21 | Stop reason: SDUPTHER

## 2022-05-24 ENCOUNTER — OFFICE VISIT (OUTPATIENT)
Dept: ORTHOPEDIC SURGERY | Age: 42
End: 2022-05-24
Payer: COMMERCIAL

## 2022-05-24 DIAGNOSIS — Z96.641 STATUS POST RIGHT HIP REPLACEMENT: Primary | ICD-10-CM

## 2022-05-24 DIAGNOSIS — R26.2 DIFFICULTY WALKING: ICD-10-CM

## 2022-05-24 PROCEDURE — 97140 MANUAL THERAPY 1/> REGIONS: CPT | Performed by: PHYSICAL THERAPIST

## 2022-05-24 PROCEDURE — 97161 PT EVAL LOW COMPLEX 20 MIN: CPT | Performed by: PHYSICAL THERAPIST

## 2022-05-24 NOTE — PROGRESS NOTES
Patient Re-Evaluation    Patient Name: Cierra Duke  Date:2022  : 1980  [x]  Patient  Verified  Payor: BLUE CROSS / Plan: [address] [place] [address] / Product Type: PPO /     Total Treatment Time (min): 45    Treatment Area: R hip     HPI    The patient is a 82-year-old male referred to physical therapy by Dr. Donita Monteiro status post right hip arthroplasty completed 2022. Patient states that he has been doing well overall since the surgery. He does also have avascular necrosis in his left hip and may potentially need surgery for that as well. Patient states that his right hip still feels stiff and weak. He is currently on disability but plans to return to work for [employer]. Patient is currently able to ascend and descend stairs reciprocally. Patient states that he has a lot of difficulty donning and doffing socks. Patient's primary goal is to strengthen right hip and return to previous level of function. OBJECTIVE    Gait: Antalgic gait pattern with decreased right terminal hip extension at terminal stance. Range of motion: L LE hip flexion- 90 degrees, L IR- 15, ER- 30, R hip active flexion to 90 degrees, passive to 95 degrees in supine, 30 degrees active hip extension    Strength: L LE WNL, R LE grossly 4/5, hip flexion 3+/5    Pain: Reported a visual analog scale 2/10 at rest    Incision: healing well, no erythema or warmth noted proximal to incision    Palpation: R hip flexor restrictions    Joint mobility assessment: R iliofemoral posterior and inferior hypomobility    Soft tissue: R hip flexor restrictions    Lower Extremity Functional Scale: 20/80    Treatment: Full evaluation of the patient was completed. Manual (15 minutes): Treatment consisted of soft tissue mobilization of right hip flexor and ITB in supine. Grade 3 R iliofemoral posterior and inferior mobilizations. PROM right hip in all planes.  We discussed at length progressive plan of care and goals with the patient to develop current plan. Home exercise program included bridges, clams with green T band, steamboat's with green T band, and TKE with green T band. We initiated ROM and functional strengthening exercises focusing on R hip extensor and abductor strengthening. Written and pictorial exercises were provided to the patient. Total treatment time 45 minutes. ASSESSMENT    Physical examination reveals antalgic gait pattern, decreased gross RLE strength, R iliofemoral hypomobility, right hip flexor restrictions, and decreased functional mobility. Patient will benefit from skilled therapy to address these limitations. Long-term goal 6 weeks  1. Patient will ambulate with nonantalgic gait pattern with equal step lengths and demonstrate right terminal hip extension at terminal stance. 2.  R hip flexion strength 5/5.  3.  R hip flexion  degrees. 5.  15 point improvement in lower extremity functional scale. Short-term goal 1 weeks. 1.  Independent demonstration of a home exercise program to facilitate recovery. Total treatment time today 45 min. ICD-10-CM ICD-9-CM    1. Status post right hip replacement  Z96.641 V43.64    2. Difficulty walking  R26.2 719.7      PLAN OF CARE:    Treatment frequency 2X weekly. Duration 20 visits. Focus of therapy will be on progressive restoration of range of motion and strength, balance, and functional mobility. Therapeutic applications will include but are not limited to:  Home exercise program development and implementation with updating as needed. Intramuscular dry needling to the involved region. Manual therapy, joint mobilization, myofascial release, therapeutic exercises. Modalities including ultrasound and electric stimulation heat and ice. Kinesiotape and Carver taping for joint reeducation and approximation of tissue for neuromuscular reeducation.

## 2022-06-01 ENCOUNTER — OFFICE VISIT (OUTPATIENT)
Dept: ORTHOPEDIC SURGERY | Age: 42
End: 2022-06-01
Payer: COMMERCIAL

## 2022-06-01 DIAGNOSIS — R26.2 DIFFICULTY WALKING: ICD-10-CM

## 2022-06-01 DIAGNOSIS — M25.551 RIGHT HIP PAIN: Primary | ICD-10-CM

## 2022-06-01 PROCEDURE — 97140 MANUAL THERAPY 1/> REGIONS: CPT | Performed by: PHYSICAL THERAPIST

## 2022-06-01 PROCEDURE — 97110 THERAPEUTIC EXERCISES: CPT | Performed by: PHYSICAL THERAPIST

## 2022-06-01 NOTE — PROGRESS NOTES
PT DAILY TREATMENT NOTE    Patient Name: Tigist Ledezma  Date:2022  : 1980  [x]  Patient  Verified  Payor: CRISTINA [place]ay / Plan: [address] [place] [address] / Product Type: PPO /    Total Treatment Time (min): 60  Referring Provider: Darline Solorio PA-C    Treatment Area: R hip    SUBJECTIVE  Subjective functional status/changes:   [] No changes reported  Patient states that his home exercises have been going well. OBJECTIVE    Therapeutic Exercise: (minutes: 45)    PT Exercise Log        Activity/Exercise Date  22    Activity/Exercise      NuStep 10 minutes   X      Hip Hikes  X     Slant board   X       Balance board  X     Leg press80 LBS  X     Medial lateral gait with red T band  X                               Manual Therapy: (minutes: 15) Soft tissue mobilization of right hip flexor and ITB. Grade 3-4 R iliofemoral posterior and inferior mobilizations. PROM into all planes. ASSESSMENT  []  See Plan of Care  []  See progress note/recertification  [x]  Patient will continue to benefit from skilled therapy to address remaining functional deficits: Prolonged walking. Patient responded well to all new exercises today. We will continue plan with focus on gross R hip strengthening to achieve long-term goals. Follow-up later this week. ICD-10-CM ICD-9-CM    1. Right hip pain  M25.551 719.45    2.  Difficulty walking  R26.2 719.7      Progress towards goals / Updated goals:    PLAN  []  Upgrade activities as tolerated     [x]  Continue plan of care  []  Discharge due to:_  [] Other:_        Sangeeta Ortiz, PT 2022  10:57 AM

## 2022-06-02 ENCOUNTER — OFFICE VISIT (OUTPATIENT)
Dept: ORTHOPEDIC SURGERY | Age: 42
End: 2022-06-02
Payer: COMMERCIAL

## 2022-06-02 DIAGNOSIS — M25.551 RIGHT HIP PAIN: Primary | ICD-10-CM

## 2022-06-02 DIAGNOSIS — R26.2 DIFFICULTY WALKING: ICD-10-CM

## 2022-06-02 PROCEDURE — 97140 MANUAL THERAPY 1/> REGIONS: CPT | Performed by: PHYSICAL THERAPIST

## 2022-06-02 PROCEDURE — 97110 THERAPEUTIC EXERCISES: CPT | Performed by: PHYSICAL THERAPIST

## 2022-06-02 NOTE — PROGRESS NOTES
PT DAILY TREATMENT NOTE    Patient Name: Elise Check  Date:2022  : 1980  [x]  Patient  Verified  Payor: CRISTINA CROSS / Plan: [address] [place] [address] / Product Type: PPO /    Total Treatment Time (min): 60  Referring Provider: Mynor Calixto PA-C    Treatment Area: R hip    SUBJECTIVE  Subjective functional status/changes:   [] No changes reported  Patient states that he did well after last visit. OBJECTIVE    Therapeutic Exercise: (minutes: 45)    PT Exercise Log        Activity/Exercise Date  22    Activity/Exercise      NuStep 10 minutes   X      Hip Hikes  X     Slant board   X       Balance board  X     Leg press-80 LBS  X     Medial lateral gait with red T band  X   Bridges with green T band   X                         Manual Therapy: (minutes: 15) Soft tissue mobilization of right hip flexor and ITB. Grade 3-4 R iliofemoral posterior and inferior mobilizations. PROM into all planes. ASSESSMENT  []  See Plan of Care  []  See progress note/recertification  [x]  Patient will continue to benefit from skilled therapy to address remaining functional deficits: Prolonged walking. Patient is making good progress overall. He continues to have limited hip flexion mobility to 100 degrees. We will continue plan with focus on improving capsular mobility to improve hip flexion ROM and achieve goal of donning shoes and socks. ICD-10-CM ICD-9-CM    1. Right hip pain  M25.551 719.45    2.  Difficulty walking  R26.2 719.7      Progress towards goals / Updated goals:    PLAN  []  Upgrade activities as tolerated     [x]  Continue plan of care  []  Discharge due to:_  [] Other:_       John Velasquez, PT 2022  10:57 AM

## 2022-06-07 ENCOUNTER — OFFICE VISIT (OUTPATIENT)
Dept: ORTHOPEDIC SURGERY | Age: 42
End: 2022-06-07
Payer: COMMERCIAL

## 2022-06-07 DIAGNOSIS — Z96.641 STATUS POST RIGHT HIP REPLACEMENT: ICD-10-CM

## 2022-06-07 DIAGNOSIS — R26.2 DIFFICULTY WALKING: ICD-10-CM

## 2022-06-07 DIAGNOSIS — M25.551 RIGHT HIP PAIN: Primary | ICD-10-CM

## 2022-06-07 PROCEDURE — 97110 THERAPEUTIC EXERCISES: CPT | Performed by: PHYSICAL THERAPIST

## 2022-06-07 PROCEDURE — 97140 MANUAL THERAPY 1/> REGIONS: CPT | Performed by: PHYSICAL THERAPIST

## 2022-06-07 NOTE — PROGRESS NOTES
PT DAILY TREATMENT NOTE    Patient Name: Oumar Bond  Date:2022  : 1980  [x]  Patient  Verified  Payor: Fabio Ezekiel / Plan: [address] [place] [address] / Product Type: PPO /    Total Treatment Time (min): 60  Referring Provider: Jossie Stinson PA-C    Treatment Area: R hip    SUBJECTIVE  Subjective functional status/changes:   [] No changes reported  Having an easier time putting on his shoes/socks. OBJECTIVE    Therapeutic Exercise: (minutes: 45)    PT Exercise Log        Activity/Exercise Date  22    Activity/Exercise      NuStep 10 minutes   X      Hip Hikes  X     Slant board   X       Balance board  X     Leg press-80 LBS  X     Medial lateral gait with red T band  X   Bridges with green T band   X                         Manual Therapy: (minutes: 15) Soft tissue mobilization of right hip flexor and ITB. Grade 3-4 R iliofemoral posterior and inferior mobilizations. PROM into all planes. ASSESSMENT  []  See Plan of Care  []  See progress note/recertification  [x]  Patient will continue to benefit from skilled therapy to address remaining functional deficits: Prolonged walking. Doing very well for this point in his recovery. Subjective improvement with dressing. Ambulating without a limp. Continue working on endrange hip mobility and posterior chain strengthening. ICD-10-CM ICD-9-CM    1. Right hip pain  M25.551 719.45    2. Difficulty walking  R26.2 719.7    3.  Status post right hip replacement  Z96.641 V43.64      Progress towards goals / Updated goals:    PLAN  []  Upgrade activities as tolerated     [x]  Continue plan of care  []  Discharge due to:_  [] Other:_       Allen Nelson, PT 2022  10:57 AM

## 2022-06-09 ENCOUNTER — OFFICE VISIT (OUTPATIENT)
Dept: ORTHOPEDIC SURGERY | Age: 42
End: 2022-06-09
Payer: COMMERCIAL

## 2022-06-09 DIAGNOSIS — R26.2 DIFFICULTY WALKING: ICD-10-CM

## 2022-06-09 DIAGNOSIS — M25.551 RIGHT HIP PAIN: Primary | ICD-10-CM

## 2022-06-09 PROCEDURE — 97140 MANUAL THERAPY 1/> REGIONS: CPT | Performed by: PHYSICAL THERAPIST

## 2022-06-09 PROCEDURE — 97110 THERAPEUTIC EXERCISES: CPT | Performed by: PHYSICAL THERAPIST

## 2022-06-09 NOTE — PROGRESS NOTES
PT DAILY TREATMENT NOTE    Patient Name: Telma Joseph  Date:2022  : 1980  [x]  Patient  Verified  Payor: CRISTINA [place] / Plan: [address] [place] [address] / Product Type: PPO /    Total Treatment Time (min): 60  Referring Provider: Masoud King PA-C    Treatment Area: R hip    SUBJECTIVE  Subjective functional status/changes:   [] No changes reported  Patient states that his hip has been doing well with the new strengthening exercises. OBJECTIVE    Therapeutic Exercise: (minutes: 45)    PT Exercise Log        Activity/Exercise Date  22    Activity/Exercise      NuStep 10 minutes   X      Hip Hikes  X     Slant board   X       Balance board  X     Leg press-80 LBS  X     Medial lateral gait with red T band  X   Bridges with green T band   X                         Manual Therapy: (minutes: 15) Soft tissue mobilization of right hip flexor and ITB. Grade 3-4 R iliofemoral posterior and inferior mobilizations. PROM into all planes. ASSESSMENT  []  See Plan of Care  []  See progress note/recertification  [x]  Patient will continue to benefit from skilled therapy to address remaining functional deficits: Prolonged walking and standing. Patient is making great progress. He demonstrates improved gait pattern with equal step lengths and improved right terminal hip extension at terminal stance. We will continue plan with focus on progressing glutes strengthening interventions to progress toward long-term goals. ICD-10-CM ICD-9-CM    1. Right hip pain  M25.551 719.45    2.  Difficulty walking  R26.2 719.7      Progress towards goals / Updated goals:    PLAN  []  Upgrade activities as tolerated     [x]  Continue plan of care  []  Discharge due to:_  [] Other:_       Werner Alegria, PT 2022  10:57 AM

## 2022-06-14 ENCOUNTER — OFFICE VISIT (OUTPATIENT)
Dept: ORTHOPEDIC SURGERY | Age: 42
End: 2022-06-14
Payer: COMMERCIAL

## 2022-06-14 DIAGNOSIS — R26.2 DIFFICULTY WALKING: ICD-10-CM

## 2022-06-14 DIAGNOSIS — Z96.641 STATUS POST RIGHT HIP REPLACEMENT: ICD-10-CM

## 2022-06-14 DIAGNOSIS — M25.551 RIGHT HIP PAIN: Primary | ICD-10-CM

## 2022-06-14 PROCEDURE — 97110 THERAPEUTIC EXERCISES: CPT | Performed by: PHYSICAL THERAPIST

## 2022-06-14 PROCEDURE — 97140 MANUAL THERAPY 1/> REGIONS: CPT | Performed by: PHYSICAL THERAPIST

## 2022-06-14 NOTE — PROGRESS NOTES
PT DAILY TREATMENT NOTE    Patient Name: Yana Houston  Date:2022  : 1980  [x]  Patient  Verified  Payor: CRISTINA CROSS / Plan: [address] [place] [address] / Product Type: PPO /    Total Treatment Time (min): 55  Referring Provider: Queen John PA-C    Treatment Area: R hip    SUBJECTIVE  Subjective functional status/changes:   [] No changes reported  Patient states that his hip is doing good, he is now able to put his socks on. OBJECTIVE    Therapeutic Exercise: (minutes: 40)    PT Exercise Log        Activity/Exercise Date  22    Activity/Exercise      NuStep 10 minutes   X      Hip Hikes  X     Slant board   X       Balance board  X     Leg press-80 LBS  X     Medial lateral gait with red T band  X   Bridges with green T band   X     Lat Walk with green tband X                   Manual Therapy: (minutes: 15) Soft tissue mobilization of right hip flexor and ITB. Grade 3-4 R iliofemoral posterior and inferior mobilizations. PROM into all planes. ASSESSMENT  []  See Plan of Care  []  See progress note/recertification  [x]  Patient will continue to benefit from skilled therapy to address remaining functional deficits: Prolonged walking and standing. Patient is making great progress. Passive hip flexion has improved to 110 degrees. Continue plan with focus on progressing hip abductor and extensor strengthening exercises. ICD-10-CM ICD-9-CM    1. Right hip pain  M25.551 719.45    2. Difficulty walking  R26.2 719.7    3.  Status post right hip replacement  Z96.641 V43.64      Progress towards goals / Updated goals:    PLAN  []  Upgrade activities as tolerated     [x]  Continue plan of care  []  Discharge due to:_  [] Other:_       Niurka Friedman, PT 2022  10:57 AM

## 2022-06-16 ENCOUNTER — OFFICE VISIT (OUTPATIENT)
Dept: ORTHOPEDIC SURGERY | Age: 42
End: 2022-06-16
Payer: COMMERCIAL

## 2022-06-16 DIAGNOSIS — M25.551 RIGHT HIP PAIN: Primary | ICD-10-CM

## 2022-06-16 DIAGNOSIS — Z96.641 STATUS POST RIGHT HIP REPLACEMENT: ICD-10-CM

## 2022-06-16 DIAGNOSIS — R26.2 DIFFICULTY WALKING: ICD-10-CM

## 2022-06-16 PROCEDURE — 97110 THERAPEUTIC EXERCISES: CPT | Performed by: PHYSICAL THERAPIST

## 2022-06-16 PROCEDURE — 97140 MANUAL THERAPY 1/> REGIONS: CPT | Performed by: PHYSICAL THERAPIST

## 2022-06-16 NOTE — PROGRESS NOTES
PT DAILY TREATMENT NOTE    Patient Name: Bob Ruiz  Date:2022  : 1980  [x]  Patient  Verified  Payor: CRISTINA CROSS / Plan: [address] [place] [address] / Product Type: PPO /    Total Treatment Time (min): 55  Referring Provider: Cas German PA-C    Treatment Area: R hip    SUBJECTIVE  Subjective functional status/changes:   [] No changes reported  Patient states that his hip is still doing well. OBJECTIVE    Therapeutic Exercise: (minutes: 40)    PT Exercise Log        Activity/Exercise Date  22    Activity/Exercise      NuStep 10 minutes   X      Hip Hikes  X     Slant board   X       Balance board  X     Leg press-80 LBS  X     Medial lateral gait with red T band  X   Bridges with green T band   X     Lat Walk with green tband X                   Manual Therapy: (minutes: 15) Soft tissue mobilization of right hip flexor and ITB. Grade 3-4 R iliofemoral posterior and inferior mobilizations. PROM into all planes. ASSESSMENT  []  See Plan of Care  []  See progress note/recertification  [x]  Patient will continue to benefit from skilled therapy to address remaining functional deficits: Prolonged walking and standing. Patient is making great progress with ROM. Passive hip flexion has improved to 110 degrees. Hip flexor abductor and extensor weakness remains. We will continue plan with progressing strengthening to address this limitation. ICD-10-CM ICD-9-CM    1. Right hip pain  M25.551 719.45    2. Difficulty walking  R26.2 719.7    3.  Status post right hip replacement  Z96.641 V43.64      Progress towards goals / Updated goals:    PLAN  []  Upgrade activities as tolerated     [x]  Continue plan of care  []  Discharge due to:_  [] Other:_       Amador Jorgensen, PT 2022  10:57 AM

## 2022-06-21 ENCOUNTER — OFFICE VISIT (OUTPATIENT)
Dept: ORTHOPEDIC SURGERY | Age: 42
End: 2022-06-21
Payer: COMMERCIAL

## 2022-06-21 DIAGNOSIS — Z96.641 STATUS POST RIGHT HIP REPLACEMENT: Primary | ICD-10-CM

## 2022-06-21 DIAGNOSIS — M25.551 RIGHT HIP PAIN: ICD-10-CM

## 2022-06-21 PROCEDURE — 97140 MANUAL THERAPY 1/> REGIONS: CPT | Performed by: PHYSICAL THERAPIST

## 2022-06-21 PROCEDURE — 97110 THERAPEUTIC EXERCISES: CPT | Performed by: PHYSICAL THERAPIST

## 2022-06-21 NOTE — PROGRESS NOTES
PT DAILY TREATMENT NOTE    Patient Name: Luis Peters  Date:2022  : 1980  [x]  Patient  Verified  Payor: BLUE CROSS / Plan: [address] [place] [address] / Product Type: PPO /    Total Treatment Time (min): 55  Referring Provider: Gissell Pool PA-C    Treatment Area: R hip    SUBJECTIVE  Subjective functional status/changes:   [] No changes reported  Doing well, nothing new since last visit. OBJECTIVE    Therapeutic Exercise: (minutes: 40)    PT Exercise Log        Activity/Exercise Date  22    Activity/Exercise      NuStep 10 minutes   X      Hip Hikes  X     Slant board   X       Balance board  X     Leg press-80 LBS  X     Medial lateral gait with red T band  X   Bridges with green T band   X     Lat Walk with green tband X                   Manual Therapy: (minutes: 15) Soft tissue mobilization of right hip flexor and ITB inmodified Tito test position. Grade 3-4 R iliofemoral posterior and inferior mobilizations. PROM into all planes. ASSESSMENT  []  See Plan of Care  []  See progress note/recertification  [x]  Patient will continue to benefit from skilled therapy to address remaining functional deficits: Prolonged walking and standing. Patient is doing quite well. He does have some tightness in his hip flexors, introduced hip flexor hang today. Follow again later this week. Continue progressing posterior chain strengthening. ICD-10-CM ICD-9-CM    1. Status post right hip replacement  Z96.641 V43.64    2.  Right hip pain  M25.551 719.45      Progress towards goals / Updated goals:    PLAN  []  Upgrade activities as tolerated     [x]  Continue plan of care  []  Discharge due to:_  [] Other:_       Trudy Bolden, PT 2022  10:57 AM

## 2022-06-23 ENCOUNTER — OFFICE VISIT (OUTPATIENT)
Dept: ORTHOPEDIC SURGERY | Age: 42
End: 2022-06-23
Payer: COMMERCIAL

## 2022-06-23 DIAGNOSIS — M25.551 RIGHT HIP PAIN: ICD-10-CM

## 2022-06-23 DIAGNOSIS — Z96.641 STATUS POST RIGHT HIP REPLACEMENT: Primary | ICD-10-CM

## 2022-06-23 DIAGNOSIS — R26.2 DIFFICULTY WALKING: ICD-10-CM

## 2022-06-23 PROCEDURE — 97140 MANUAL THERAPY 1/> REGIONS: CPT | Performed by: PHYSICAL THERAPIST

## 2022-06-23 PROCEDURE — 97110 THERAPEUTIC EXERCISES: CPT | Performed by: PHYSICAL THERAPIST

## 2022-06-23 NOTE — PROGRESS NOTES
PT DAILY TREATMENT NOTE    Patient Name: Stephane Dorado  Date:2022  : 1980  [x]  Patient  Verified  Payor: BLUE CROSS / Plan: [address] [place] [address] / Product Type: PPO /    Total Treatment Time (min): 55  Referring Provider: Herminia Aviles PA-C    Treatment Area: R hip    SUBJECTIVE  Subjective functional status/changes:   [] No changes reported  Doing well, not really having any difficulty at home. OBJECTIVE    Therapeutic Exercise: (minutes: 40)    PT Exercise Log        Activity/Exercise Date  22    Activity/Exercise      NuStep 10 minutes   X      Hip Hikes  X     Slant board   X       Balance board  X     Leg press-80 LBS  X     Medial lateral gait with red T band  X   Bridges with green T band   X     Lat Walk with green tband X   Sidelying clam  Retrobungee walk x     Multi hip abduction x   Total Gym x       Manual Therapy: (minutes: 15) Soft tissue mobilization of right hip flexor and ITB in modified Tito test position. Grade 3-4 R iliofemoral posterior and inferior mobilizations. PROM into all planes. ASSESSMENT  []  See Plan of Care  []  See progress note/recertification  [x]  Patient will continue to benefit from skilled therapy to address remaining functional deficits: Prolonged walking and standing. Fatigued with posterior chain strengthening progression but did well. Overall he is doing very well, ambulating without a limp and still a bit stiff with endrange hip flexion and IR. Follow again next week. ICD-10-CM ICD-9-CM    1. Status post right hip replacement  Z96.641 V43.64    2. Right hip pain  M25.551 719.45    3.  Difficulty walking  R26.2 719.7      Progress towards goals / Updated goals:    PLAN  []  Upgrade activities as tolerated     [x]  Continue plan of care  []  Discharge due to:_  [] Other:_       Charo Stern, PT 2022  10:57 AM

## 2022-06-28 ENCOUNTER — OFFICE VISIT (OUTPATIENT)
Dept: ORTHOPEDIC SURGERY | Age: 42
End: 2022-06-28
Payer: COMMERCIAL

## 2022-06-28 DIAGNOSIS — Z96.641 STATUS POST RIGHT HIP REPLACEMENT: ICD-10-CM

## 2022-06-28 DIAGNOSIS — R26.2 DIFFICULTY WALKING: ICD-10-CM

## 2022-06-28 DIAGNOSIS — M25.551 RIGHT HIP PAIN: Primary | ICD-10-CM

## 2022-06-28 PROCEDURE — 97140 MANUAL THERAPY 1/> REGIONS: CPT | Performed by: PHYSICAL THERAPIST

## 2022-06-28 PROCEDURE — 97110 THERAPEUTIC EXERCISES: CPT | Performed by: PHYSICAL THERAPIST

## 2022-06-28 NOTE — PROGRESS NOTES
PT DAILY TREATMENT NOTE    Patient Name: Galdino Solorio  Date:2022  : 1980  [x]  Patient  Verified  Payor: BLUE CROSS / Plan: [address] [place] [address] / Product Type: PPO /    Total Treatment Time (min): 55  Referring Provider: Delphine Golden PA-C    Treatment Area: R hip    SUBJECTIVE  Subjective functional status/changes:   [] No changes reported  Doing well, he is hesitant to return to work because he has not been squatting or doing any heavy lifting. OBJECTIVE    Therapeutic Exercise: (minutes: 40)    PT Exercise Log        Activity/Exercise Date  22    Activity/Exercise      NuStep 10 minutes   X      Hip Hikes  X     Slant board   X       Balance board  X     Leg press-80 LBS  X     Medial lateral gait with red T band  X   Bridges with green T band   X     Lat Walk with green tband X   Sidelying clam  Retrobungee walk x     Multi hip abduction x   Total Gym x       Manual Therapy: (minutes: 15) Soft tissue mobilization of right hip flexor and ITB in modified Itto test position. Grade 3-4 R iliofemoral posterior and inferior mobilizations. PROM into all planes. ASSESSMENT  []  See Plan of Care  []  See progress note/recertification  [x]  Patient will continue to benefit from skilled therapy to address remaining functional deficits: Prolonged walking and standing. Patient is making great progress overall. His primary limitation at this time is return to work. Patient works at [employer] and requires frequent lifting and going up and down steps. We will continue to focus on progressing glutes strengthening interventions to progress towards patient goal of return to work. ICD-10-CM ICD-9-CM    1. Right hip pain  M25.551 719.45    2. Status post right hip replacement  Z96.641 V43.64    3.  Difficulty walking  R26.2 719.7      Progress towards goals / Updated goals:    PLAN  []  Upgrade activities as tolerated     [x]  Continue plan of care  []  Discharge due to:_  [] Other:_       Bettina Isaac, PT 6/28/2022  10:57 AM

## 2022-06-30 ENCOUNTER — OFFICE VISIT (OUTPATIENT)
Dept: ORTHOPEDIC SURGERY | Age: 42
End: 2022-06-30
Payer: COMMERCIAL

## 2022-06-30 DIAGNOSIS — R26.2 DIFFICULTY WALKING: ICD-10-CM

## 2022-06-30 DIAGNOSIS — M25.551 RIGHT HIP PAIN: Primary | ICD-10-CM

## 2022-06-30 PROCEDURE — 97140 MANUAL THERAPY 1/> REGIONS: CPT | Performed by: PHYSICAL THERAPIST

## 2022-06-30 PROCEDURE — 97110 THERAPEUTIC EXERCISES: CPT | Performed by: PHYSICAL THERAPIST

## 2022-06-30 NOTE — PROGRESS NOTES
PT DAILY TREATMENT NOTE    Patient Name: Coral Savage  Date:2022  : 1980  [x]  Patient  Verified  Payor: CRISTINA Walbridge / Plan: [address] [place] [address] / Product Type: PPO /    Total Treatment Time (min): 55  Referring Provider: Anirudh Charles PA-C    Treatment Area: R hip    SUBJECTIVE  Subjective functional status/changes:   [] No changes reported  Feeling better overall. Unsure about returning to work. OBJECTIVE    Therapeutic Exercise: (minutes: 40)    PT Exercise Log        Activity/Exercise Date  22    Activity/Exercise      NuStep 10 minutes   X      Hip Hikes  X     Slant board   X       Balance board  X     Leg press-80 LBS  X     Medial lateral gait with red T band  X   Bridges with green T band   X     Lat Walk with green tband X   Sidelying clam  Retrobungee walk x     Multi hip abduction x   Total Gym x       Manual Therapy: (minutes: 15) Soft tissue mobilization of right hip flexor and ITB in modified Tito test position. Grade 3-4 R iliofemoral posterior and inferior mobilizations. PROM into all planes. ASSESSMENT  []  See Plan of Care  []  See progress note/recertification  [x]  Patient will continue to benefit from skilled therapy to address remaining functional deficits: Prolonged walking and standing. Patient is making great progress overall. His primary limitation at this time is return to work. Patient works at [employer] and requires frequent lifting and going up and down steps. We will continue to focus on progressing glutes strengthening interventions to progress towards patient goal of return to work. ICD-10-CM ICD-9-CM    1. Right hip pain  M25.551 719.45    2.  Difficulty walking  R26.2 719.7      Progress towards goals / Updated goals:    PLAN  []  Upgrade activities as tolerated     [x]  Continue plan of care  []  Discharge due to:_  [] Other:_       Camila Stoney, PT 2022  10:57 AM

## 2022-07-01 ENCOUNTER — OFFICE VISIT (OUTPATIENT)
Dept: ORTHOPEDIC SURGERY | Age: 42
End: 2022-07-01
Payer: COMMERCIAL

## 2022-07-01 VITALS — WEIGHT: 168 LBS | HEIGHT: 67 IN | BODY MASS INDEX: 26.37 KG/M2

## 2022-07-01 DIAGNOSIS — M87.052 AVASCULAR NECROSIS OF BONE OF LEFT HIP (HCC): ICD-10-CM

## 2022-07-01 DIAGNOSIS — Z98.890 STATUS POST SURGERY: Primary | ICD-10-CM

## 2022-07-01 PROCEDURE — 99214 OFFICE O/P EST MOD 30 MIN: CPT | Performed by: ORTHOPAEDIC SURGERY

## 2022-07-01 NOTE — LETTER
NOTIFICATION RETURN TO WORK     7/1/2022     Mr. Zelaya  [address] [address]      To Whom It May Concern:    Edgar Light is currently under the care of [facility]. He will return to work/school on: 07/16/2022 with no restrictions. If there are questions or concerns please have the patient contact our office.         Sincerely,      Brandi Chandra MD

## 2022-07-01 NOTE — PROGRESS NOTES
Blue Adamson (: 1980) is a 46 y.o. male patient, here for evaluation of the following chief complaint(s):  Surgical Follow-up (second post op follow up, RTHR 2022)       ASSESSMENT/PLAN:  Below is the assessment and plan developed based on review of pertinent history, physical exam, labs, studies, and medications. 62-year-old male comes in today for 2 reasons. He is here for postop right total hip. 10 weeks out. He is doing great. He said he has minimal pain is very happy with his progress. He is also here for his left hip. He has avascular necrosis with subchondral collapse and fracture. He wanted to discuss future total hip arthroplasty. Risks and benefits of total hip again discussed. He wants to think about the timing but is likely planning on considering scheduling for this fall or early winter. I told him if this is the case he does not need to come back in for another preoperative visit if he does not choose to. If he waits until next spring I would like to see him again for a preoperative visit. 1. Status post surgery  2. Avascular necrosis of bone of left hip ([facility] [address]      Encounter Diagnoses   Name Primary?  Status post surgery Yes    Avascular necrosis of bone of left hip (HCC)         No follow-ups on file. SUBJECTIVE/OBJECTIVE:  Blue Adamson (: 1980) is a 46 y.o. male who presents today for the following:  Chief Complaint   Patient presents with    Surgical Follow-up     second post op follow up, RTHR 2022       62-year-old male comes in today for 2 reasons. He is here for postop right total hip. 10 weeks out. He is walking without a walker. He has a minimal limp on the right but does have a limp on the left from his avascular necrosis he is doing great. He said he has minimal pain is very happy with his progress. He is also here for his left hip. He has avascular necrosis with subchondral collapse and fracture.      IMAGING:  XR Results (most recent):  Results from Appointment encounter on 05/20/22    XR HIP RT W OR WO PELV 2-3 VWS    Narrative  Radiographs reviewed including 2 views of the right hip. Status post hip arthroplasty. No evidence of aseptic loosening. Leg lengths and offset are appropriate. Allergies   Allergen Reactions    Pcn [Penicillins] Unknown (comments)       Current Outpatient Medications   Medication Sig    amLODIPine (NORVASC) 5 mg tablet TAKE 1 TABLET BY MOUTH EVERY DAY    meloxicam (MOBIC) 15 mg tablet Take 1 Tablet by mouth daily. No current facility-administered medications for this visit. Past Medical History:   Diagnosis Date    Contact dermatitis and other eczema, due to unspecified cause         No past surgical history on file. Family History   Problem Relation Age of Onset    Diabetes Mother     Hypertension Father     Hypertension Brother         Social History     Tobacco Use    Smoking status: Never Smoker    Smokeless tobacco: Never Used   Substance Use Topics    Alcohol use: Yes        All systems reviewed x 12 and were negative with the exception of None      No flowsheet data found. Vitals:  Ht 5' 7\" (1.702 m)   Wt 168 lb (76.2 kg)   BMI 26.31 kg/m²    Body mass index is 26.31 kg/m². Physical Exam    General: NAD    Cardiac: Extremities well perfused. Respiratory: Nonlabored breathing. RLE: Incision clean dry and intact with no erythema. Minimal numbness over the LFCN. Normal gait and station. Negative stinchfield. No pain with gentle internal and external rotation. .  Motor strength is grossly intact. Skin warm well perfused. Capillary refill <2 sec. Left hip with significant antalgic gait. Positive Stinchfield. Pain with flexion and internal rotation        An electronic signature was used to authenticate this note.   -- Conrad Olivares MD

## 2022-11-21 ENCOUNTER — OFFICE VISIT (OUTPATIENT)
Dept: INTERNAL MEDICINE CLINIC | Age: 42
End: 2022-11-21
Payer: COMMERCIAL

## 2022-11-21 VITALS
TEMPERATURE: 96.8 F | SYSTOLIC BLOOD PRESSURE: 160 MMHG | BODY MASS INDEX: 26.87 KG/M2 | DIASTOLIC BLOOD PRESSURE: 90 MMHG | HEIGHT: 67 IN | OXYGEN SATURATION: 91 % | WEIGHT: 171.2 LBS | HEART RATE: 90 BPM | RESPIRATION RATE: 16 BRPM

## 2022-11-21 DIAGNOSIS — M10.9 GOUT, UNSPECIFIED CAUSE, UNSPECIFIED CHRONICITY, UNSPECIFIED SITE: ICD-10-CM

## 2022-11-21 DIAGNOSIS — I10 ESSENTIAL HYPERTENSION: Primary | ICD-10-CM

## 2022-11-21 PROCEDURE — 99213 OFFICE O/P EST LOW 20 MIN: CPT | Performed by: NURSE PRACTITIONER

## 2022-11-21 RX ORDER — AMLODIPINE BESYLATE 10 MG/1
10 TABLET ORAL DAILY
Qty: 30 TABLET | Refills: 1 | Status: SHIPPED | OUTPATIENT
Start: 2022-11-21

## 2022-11-21 RX ORDER — NAPROXEN SODIUM 220 MG
220 TABLET ORAL 2 TIMES DAILY WITH MEALS
COMMUNITY

## 2022-11-21 RX ORDER — LOSARTAN POTASSIUM 25 MG/1
25 TABLET ORAL DAILY
Qty: 30 TABLET | Refills: 1 | Status: SHIPPED | OUTPATIENT
Start: 2022-11-21

## 2022-11-21 RX ORDER — METHYLPREDNISOLONE 4 MG/1
TABLET ORAL
Qty: 1 DOSE PACK | Refills: 0 | Status: SHIPPED | OUTPATIENT
Start: 2022-11-21

## 2022-11-21 NOTE — PROGRESS NOTES
HISTORY OF PRESENT ILLNESS  Nallely Ho is a 38 y.o. male. This patient of Darline Morales NP presents today for ER follow-up. The patient presented to [facility] ER 11/14/22 with dizziness. He states CT of head, chesty x-ray, EKG, and lab work was completed that were negative. His blood pressure was notably elevated, systolic 649C. He states he received antihypertensive medication in the ER and was discharged home to resume amlodipine, which he had been on previously when BP was elevated following a surgery. He has been taking amlodipine 10 mg daily over the last week. BP monitoring at home has [id number]. Dizziness has resolved. Patient was able to return to work over the weekend. He does have complaints of intermittent gout flares. He currently has pain to left great toe. He has been taking Aleve to assist with pain relief. No dizziness, headaches, chest pain, or shortness of breath at this time. Visit Vitals  BP (!) 160/90 (BP 1 Location: Right upper arm, BP Patient Position: Sitting, BP Cuff Size: Adult)   Pulse 90   Temp 96.8 °F (36 °C) (Oral)   Resp 16   Ht 5' 7\" (1.702 m)   Wt 171 lb 3.2 oz (77.7 kg)   SpO2 91%   BMI 26.81 kg/m²       HPI    Review of Systems   Constitutional:  Negative for chills and fever. HENT:  Negative for congestion. Respiratory:  Negative for cough and shortness of breath. Cardiovascular:  Negative for chest pain, palpitations and leg swelling. Gastrointestinal: Negative. Genitourinary: Negative. Musculoskeletal:  Positive for joint pain. Skin: Negative. Neurological:  Negative for dizziness, speech change, focal weakness and headaches. Endo/Heme/Allergies: Negative. Psychiatric/Behavioral: Negative. Physical Exam  Constitutional:       General: He is not in acute distress. HENT:      Head: Normocephalic and atraumatic. Nose: No congestion.    Eyes:      Conjunctiva/sclera: Conjunctivae normal.   Cardiovascular:      Rate and Rhythm: Normal rate and regular rhythm. Pulmonary:      Effort: Pulmonary effort is normal.      Breath sounds: Normal breath sounds. Abdominal:      General: Bowel sounds are normal.      Palpations: Abdomen is soft. Musculoskeletal:         General: Tenderness present. Cervical back: Neck supple. Right lower leg: No edema. Left lower leg: No edema. Comments: Tenderness left great toe, no overlying bruising   Skin:     General: Skin is warm and dry. Neurological:      Mental Status: He is alert and oriented to person, place, and time. Psychiatric:         Mood and Affect: Mood normal.         Behavior: Behavior normal.       ASSESSMENT and PLAN    ICD-10-CM ICD-9-CM    1. Essential hypertension  I10 401.9 BP elevated in office, 160/90. Will order,  Losartan 25 mg po daily. Continue amlodipine 10 mg po daily. In 2 weeks,   CBC WITH AUTOMATED DIFF      METABOLIC PANEL, COMPREHENSIVE  DASH diet  Advised to monitor BP daily and keep log; discussed BP goals <140/90. Follow-up in 3-4 weeks for BP re-check. 2. Gout, unspecified cause, unspecified chronicity, unspecified site  M10.9 274.9 In 2 weeks, URIC ACID      Will order  methylPREDNISolone (MEDROL DOSEPACK) 4 mg tablet as per dose pack instructions. Patient encouraged to call or return to office if symptoms do not improve or worsen. Reviewed medications and side effects in detail. Reviewed plan of care with patient who acknowledges understanding and agrees.

## 2022-11-21 NOTE — PROGRESS NOTES
Health Maintenance Due   Topic Date Due    COVID-19 Vaccine (1) Never done    DTaP/Tdap/Td series (1 - Tdap) Never done    Flu Vaccine (1) Never done       Chief Complaint   Patient presents with    Hypertension    Difficulty Walking       1. Have you been to the ER, urgent care clinic since your last visit? Hospitalized since your last visit? YES, 11/19, [facility], DIFFICULTY WALKING LOST BALANCE,    2. Have you seen or consulted any other health care providers outside of the [address] [place] [address] since your last visit? Include any pap smears or colon screening. No    3) Do you have an Advance Directive on file? no    4) Are you interested in receiving information on Advance Directives? NO      Patient is accompanied by self I have received verbal consent from Willis Kramer to discuss any/all medical information while they are present in the room.

## 2022-11-21 NOTE — LETTER
NOTIFICATION RETURN TO WORK / SCHOOL    11/21/2022 9:36 AM    Mr. Tarik Diop  [address] [address]      To Whom It May Concern:    Tarik Diop is currently under the care of [address]. Please excuse him from work 11/14/22- 11/18/22. If there are questions or concerns please have the patient contact our office.         Sincerely,      Clint Hedrick NP

## 2022-12-07 NOTE — PROGRESS NOTES
Hbg improved, but add an iron and ferritin   Uric is elevated. Was he having a flare of gout when he took these labs? Cr is elevated. Will need to repeat this when he comes in aibsai few weeks.

## 2022-12-16 ENCOUNTER — OFFICE VISIT (OUTPATIENT)
Dept: INTERNAL MEDICINE CLINIC | Age: 42
End: 2022-12-16
Payer: COMMERCIAL

## 2022-12-16 VITALS
HEART RATE: 84 BPM | TEMPERATURE: 97.4 F | WEIGHT: 176.1 LBS | BODY MASS INDEX: 27.64 KG/M2 | RESPIRATION RATE: 16 BRPM | SYSTOLIC BLOOD PRESSURE: 144 MMHG | DIASTOLIC BLOOD PRESSURE: 98 MMHG | HEIGHT: 67 IN | OXYGEN SATURATION: 98 %

## 2022-12-16 DIAGNOSIS — N52.9 ERECTILE DYSFUNCTION, UNSPECIFIED ERECTILE DYSFUNCTION TYPE: ICD-10-CM

## 2022-12-16 DIAGNOSIS — M10.9 GOUT, UNSPECIFIED CAUSE, UNSPECIFIED CHRONICITY, UNSPECIFIED SITE: ICD-10-CM

## 2022-12-16 DIAGNOSIS — I10 ESSENTIAL HYPERTENSION: Primary | ICD-10-CM

## 2022-12-16 RX ORDER — LISINOPRIL 20 MG/1
20 TABLET ORAL 2 TIMES DAILY
Qty: 60 TABLET | Refills: 1 | Status: SHIPPED | OUTPATIENT
Start: 2022-12-16

## 2022-12-16 RX ORDER — METHYLPREDNISOLONE 4 MG/1
TABLET ORAL
Qty: 1 DOSE PACK | Refills: 0 | Status: SHIPPED | OUTPATIENT
Start: 2022-12-16

## 2022-12-16 NOTE — PROGRESS NOTES
HISTORY OF PRESENT ILLNESS  Stephenie Reddy is a 38 y.o. male. Patient was seen follow BP and gout flare. Was treated for this on last visit the the prednisone got thrown away. Is still having swelling and tenderness to the knee. Uric was above 10. Patient BP remains elevated. Was placed on losartan on the last visit. The BP has remained uncharged. . is also having concern with ED. Visit Vitals  BP (!) 144/98 (BP 1 Location: Right upper arm, BP Patient Position: Sitting, BP Cuff Size: Adult)   Pulse 84   Temp 97.4 °F (36.3 °C) (Oral)   Resp 16   Ht 5' 7\" (1.702 m)   Wt 176 lb 1.6 oz (79.9 kg)   SpO2 98%   BMI 27.58 kg/m²     Past Medical History:   Diagnosis Date    Contact dermatitis and other eczema, due to unspecified cause    No past surgical history on file. Family History   Problem Relation Age of Onset    Diabetes Mother     Hypertension Father     Hypertension Brother      Outpatient Encounter Medications as of 12/16/2022   Medication Sig Dispense Refill    lisinopriL (PRINIVIL, ZESTRIL) 20 mg tablet Take 1 Tablet by mouth two (2) times a day. 60 Tablet 1    methylPREDNISolone (MEDROL DOSEPACK) 4 mg tablet Take as directed 1 Dose Pack 0    [DISCONTINUED] naproxen sodium (Aleve) 220 mg tablet Take 220 mg by mouth two (2) times daily (with meals). [DISCONTINUED] losartan (COZAAR) 25 mg tablet Take 1 Tablet by mouth daily. 30 Tablet 1    [DISCONTINUED] methylPREDNISolone (MEDROL DOSEPACK) 4 mg tablet As per dose pack instructions 1 Dose Pack 0    [DISCONTINUED] amLODIPine (NORVASC) 10 mg tablet Take 1 Tablet by mouth daily. 30 Tablet 1     No facility-administered encounter medications on file as of 12/16/2022. HPI    Review of Systems   Constitutional:  Negative for chills and fever. Respiratory: Negative. Cardiovascular: Negative. Gastrointestinal:  Negative for heartburn. Genitourinary:         ED concern    Musculoskeletal:  Positive for joint pain.    Neurological:  Negative for dizziness and headaches. Physical Exam  Vitals and nursing note reviewed. Cardiovascular:      Rate and Rhythm: Normal rate and regular rhythm. Pulmonary:      Effort: Pulmonary effort is normal.      Breath sounds: Normal breath sounds. Abdominal:      General: Bowel sounds are normal.      Palpations: Abdomen is soft. Musculoskeletal:      Right knee: Normal.      Left knee: Swelling present. Tenderness present. Right lower leg: No edema. Left lower leg: No edema. Skin:     General: Skin is warm. Neurological:      Mental Status: He is alert and oriented to person, place, and time. Psychiatric:         Behavior: Behavior normal.       ASSESSMENT and PLAN  Diagnoses and all orders for this visit:    1. Essential hypertension  -     lisinopriL (PRINIVIL, ZESTRIL) 20 mg tablet; Take 1 Tablet by mouth two (2) times a day. -    DASH diet. BP below 140/80  2. Gout, unspecified cause, unspecified chronicity, unspecified site  -     methylPREDNISolone (MEDROL DOSEPACK) 4 mg tablet; Take as directed    3. Erectile dysfunction, unspecified erectile dysfunction type       -     try to change BP medication to help     Follow-up and Dispositions    Return in about 2 weeks (around 12/30/2022).        lab results and schedule of future lab studies reviewed with patient  reviewed diet, exercise and weight control  reviewed medications and side effects in detail

## 2022-12-16 NOTE — PROGRESS NOTES
Nursing staff confirmed patient with full name and . Prepared patient for visit today by obtaining vitals, verifying medication list and allergies, and briefly discussing reason for visit. Chief Complaint   Patient presents with    Hypertension    Foot Pain    Gout       Current Outpatient Medications   Medication Sig    losartan (COZAAR) 25 mg tablet Take 1 Tablet by mouth daily. amLODIPine (NORVASC) 10 mg tablet Take 1 Tablet by mouth daily. No current facility-administered medications for this visit. 1. \"Have you been to the ER, urgent care clinic since your last visit? Hospitalized since your last visit? \" No    2. \"Have you seen or consulted any other health care providers outside of the [address] [place] [address] since your last visit? \" No     3. For patients aged 39-70: Has the patient had a colonoscopy / FIT/ Cologuard? NA - based on age      If the patient is female:    4. For patients aged 41-77: Has the patient had a mammogram within the past 2 years? NA - based on age or sex      11. For patients aged 21-65: Has the patient had a pap smear?  NA - based on age or sex

## 2023-01-03 ENCOUNTER — VIRTUAL VISIT (OUTPATIENT)
Dept: INTERNAL MEDICINE CLINIC | Age: 43
End: 2023-01-03
Payer: COMMERCIAL

## 2023-01-03 DIAGNOSIS — I10 ESSENTIAL HYPERTENSION: Primary | ICD-10-CM

## 2023-01-03 DIAGNOSIS — M10.9 GOUT, UNSPECIFIED CAUSE, UNSPECIFIED CHRONICITY, UNSPECIFIED SITE: ICD-10-CM

## 2023-01-03 PROCEDURE — 99213 OFFICE O/P EST LOW 20 MIN: CPT | Performed by: INTERNAL MEDICINE

## 2023-01-03 RX ORDER — COLCHICINE 0.6 MG/1
0.6 TABLET ORAL DAILY
Qty: 30 TABLET | Refills: 1 | Status: SHIPPED | OUTPATIENT
Start: 2023-01-03

## 2023-01-03 NOTE — PROGRESS NOTES
ADVISED PATIENT OF THE FOLLOWING HEALTH MAINTAINCE DUE  Health Maintenance Due   Topic Date Due    COVID-19 Vaccine (1) Never done    DTaP/Tdap/Td series (1 - Tdap) Never done    Flu Vaccine (1) Never done    No chief complaint on file. 1. \"Have you been to the ER, urgent care clinic since your last visit? Hospitalized since your last visit? \" No    2. \"Have you seen or consulted any other health care providers outside of the [address] [place] [address] since your last visit? \" No     3. For patients aged 39-70: Has the patient had a colonoscopy / FIT/ Cologuard? NA - based on age      If the patient is female:    4. For patients aged 41-77: Has the patient had a mammogram within the past 2 years? NA - based on age or sex      11. For patients aged 21-65: Has the patient had a pap smear?  NA - based on age or sex

## 2023-01-04 NOTE — PROGRESS NOTES
Saad Loo is a 38 y.o. male who was seen by synchronous (real-time) audio-video technology on 1/3/2023 for Follow-up and Foot Pain (Left foot is still causing some discomfort and has a little bit of swelling. )        Assessment & Plan:   Diagnoses and all orders for this visit:    1. Essential hypertension    2. Gout, unspecified cause, unspecified chronicity, unspecified site  -     colchicine 0.6 mg tablet; Take 1 Tablet by mouth daily. Follow-up and Dispositions    Return in 6 months (on 7/3/2023), or if symptoms worsen or fail to improve. Subjective:     Patient was seen via video. Is being followed up with his BP. After switching her BP medication reports that his BP is is the 120-130's. Is taking the medication daily and with cuff purchased. Denies any cough, SOB of HA. Continues to have joint pain to the knee. The steroids for his gout improved with the steroids, but is still slightly swollen. No redness. Dad and brother both have gout. Prior to Admission medications    Medication Sig Start Date End Date Taking? Authorizing Provider   colchicine 0.6 mg tablet Take 1 Tablet by mouth daily. 1/3/23  Yes Thomas Perez NP   lisinopriL (PRINIVIL, ZESTRIL) 20 mg tablet Take 1 Tablet by mouth two (2) times a day. 12/16/22  Yes Thomas Perez NP   methylPREDNISolone (MEDROL DOSEPACK) 4 mg tablet Take as directed  Patient not taking: Reported on 1/3/2023 12/16/22 1/3/23  Brando Collins NP     Patient Active Problem List   Diagnosis Code    History of gout Z87.39     Patient Active Problem List    Diagnosis Date Noted    History of gout 03/11/2022     Current Outpatient Medications   Medication Sig Dispense Refill    colchicine 0.6 mg tablet Take 1 Tablet by mouth daily. 30 Tablet 1    lisinopriL (PRINIVIL, ZESTRIL) 20 mg tablet Take 1 Tablet by mouth two (2) times a day.  60 Tablet 1     Allergies   Allergen Reactions    Pcn [Penicillins] Unknown (comments)     Past Medical History:   Diagnosis Date    Contact dermatitis and other eczema, due to unspecified cause      History reviewed. No pertinent surgical history. Family History   Problem Relation Age of Onset    Diabetes Mother     Hypertension Father     Hypertension Brother      Social History     Tobacco Use    Smoking status: Never    Smokeless tobacco: Never   Substance Use Topics    Alcohol use: Yes       Review of Systems   Constitutional: Negative. HENT: Negative. Respiratory: Negative. Cardiovascular:  Negative for chest pain and palpitations. Gastrointestinal: Negative. Musculoskeletal:  Positive for joint pain. Negative for falls. Neurological:  Negative for dizziness and headaches. Psychiatric/Behavioral: Negative.        Objective:     Patient-Reported Vitals 1/3/2023   Patient-Reported Systolic  301   Patient-Reported Diastolic 68        [INSTRUCTIONS:  \"[x]\" Indicates a positive item  \"[]\" Indicates a negative item  -- DELETE ALL ITEMS NOT EXAMINED]    Constitutional: [x] Appears well-developed and well-nourished [x] No apparent distress      [] Abnormal -     Mental status: [x] Alert and awake  [x] Oriented to person/place/time [x] Able to follow commands    [] Abnormal -     Eyes:   EOM    [x]  Normal    [] Abnormal -   Sclera  [x]  Normal    [] Abnormal -          Discharge [x]  None visible   [] Abnormal -     HENT: [x] Normocephalic, atraumatic  [] Abnormal -   [x] Mouth/Throat: Mucous membranes are moist    External Ears [x] Normal  [] Abnormal -    Neck: [x] No visualized mass [] Abnormal -     Pulmonary/Chest: [x] Respiratory effort normal   [x] No visualized signs of difficulty breathing or respiratory distress        [] Abnormal -      Musculoskeletal:   [x] Normal gait with no signs of ataxia         [x] Normal range of motion of neck        [] Abnormal -     Neurological:        [x] No Facial Asymmetry (Cranial nerve 7 motor function) (limited exam due to video visit)          [x] No gaze palsy        [] Abnormal -          Skin:        [x] No significant exanthematous lesions or discoloration noted on facial skin         [] Abnormal -            Psychiatric:       [x] Normal Affect [] Abnormal -        [x] No Hallucinations    Other pertinent observable physical exam findings:-        We discussed the expected course, resolution and complications of the diagnosis(es) in detail. Medication risks, benefits, costs, interactions, and alternatives were discussed as indicated. I advised him to contact the office if his condition worsens, changes or fails to improve as anticipated. He expressed understanding with the diagnosis(es) and plan. Andergregor Tamar, was evaluated through a synchronous (real-time) audio-video encounter. The patient (or guardian if applicable) is aware that this is a billable service, which includes applicable co-pays. This Virtual Visit was conducted with patient's (and/or legal guardian's) consent. The visit was conducted pursuant to the emergency declaration under the [address] [place] [address] authority and the [employer] General Act. Patient identification was verified, and a caregiver was present when appropriate. The patient was located at: Home: [address] [facility] [address]  The provider was located at:  Other: office        Walker Estrada NP

## 2023-03-31 ENCOUNTER — OFFICE VISIT (OUTPATIENT)
Dept: ORTHOPEDIC SURGERY | Age: 43
End: 2023-03-31

## 2023-03-31 DIAGNOSIS — M16.12 ARTHRITIS OF LEFT HIP: ICD-10-CM

## 2023-03-31 DIAGNOSIS — M70.21 OLECRANON BURSITIS OF RIGHT ELBOW: ICD-10-CM

## 2023-03-31 DIAGNOSIS — Z96.641 STATUS POST RIGHT HIP REPLACEMENT: Primary | ICD-10-CM

## 2023-03-31 RX ORDER — METHYLPREDNISOLONE 4 MG/1
4 TABLET ORAL
Qty: 1 DOSE PACK | Refills: 0 | Status: SHIPPED | OUTPATIENT
Start: 2023-03-31

## 2023-03-31 NOTE — PROGRESS NOTES
Saintclair Schick (: 1980) is a 38 y.o. male patient, here for evaluation of the following chief complaint(s):  No chief complaint on file. ASSESSMENT/PLAN:  Below is the assessment and plan developed based on review of pertinent history, physical exam, labs, studies, and medications. Radiographs reviewed including 2 views of the right hip. Status post hip arthroplasty. No evidence of aseptic loosening. Leg lengths and offset are appropriate. Status post right hip arthroplasty on 2022. The patient is doing well and they are happy with progress. Has no pain. Can walk unlimited distances in regards to the right hip. Main complaint today is pain in the left hip. X-rays today show severe bone-on-bone osteoarthritic changes as well as significant subchondral cysts in the head and acetabulum. Symptoms consistent with exacerbation of osteoarthritis. Discussed treatment options with patient. Affects him daily. Keeps him up at night. Rates the pain as moderate to severe. He would like to move forward with a left total hip replacement. Patient seen in conjunction with Dr. Tiffany Mcneal today who is in agreement with plan of care. Risks and benefits of joint arthroplasty discussed at length including but not limited to bleeding, need for blood transfusion, infection, damage to surrounding structures, intraoperative fracture, blood clots, pulmonary embolism, death. The patient understands the risks of surgery. All questions answered. We elected to move forward. He also has superficial swelling noted to the right elbow consistent with olecranon bursitis. Is been going on the last couple of weeks. We will start patient on a steroid Dosepak to see if this can help with symptoms. No redness or signs of infection. Follow-up as needed. 1. Status post right hip replacement  -     XR HIP RT W OR WO PELV 2-3 VWS; Future  2. Arthritis of left hip  3.  Olecranon bursitis of right elbow  -     methylPREDNISolone (MEDROL DOSEPACK) 4 mg tablet; Take 1 Tablet by mouth Specific Days and Specific Times. Per dose pack instructions, Normal, Disp-1 Dose Pack, R-0      Encounter Diagnoses   Name Primary? Status post right hip replacement Yes    Arthritis of left hip     Olecranon bursitis of right elbow         No follow-ups on file. SUBJECTIVE/OBJECTIVE:  Jeff Acosta (: 1980) is a 38 y.o. male who presents today for the following:  No chief complaint on file. Status post right hip arthroplasty on 2022. The patient is doing well and they are happy with progress. Has no pain. Can walk unlimited distances in regards to the right hip. Main complaint today is pain in the left hip. X-rays today show severe bone-on-bone osteoarthritic changes as well as significant subchondral cysts in the head and acetabulum. Symptoms consistent with exacerbation of osteoarthritis. Discussed treatment options with patient. Affects him daily. Keeps him up at night. Rates the pain as moderate to severe. IMAGING:  XR Results (most recent):  Results from Appointment encounter on 22    XR HIP RT W OR WO PELV 2-3 VWS    Narrative  Radiographs reviewed including 2 views of the right hip. Status post hip arthroplasty. No evidence of aseptic loosening. Leg lengths and offset are appropriate. Allergies   Allergen Reactions    Pcn [Penicillins] Unknown (comments)       Current Outpatient Medications   Medication Sig    methylPREDNISolone (MEDROL DOSEPACK) 4 mg tablet Take 1 Tablet by mouth Specific Days and Specific Times. Per dose pack instructions    colchicine 0.6 mg tablet Take 1 Tablet by mouth daily. lisinopriL (PRINIVIL, ZESTRIL) 20 mg tablet Take 1 Tablet by mouth two (2) times a day. No current facility-administered medications for this visit.        Past Medical History:   Diagnosis Date    Contact dermatitis and other eczema, due to unspecified cause No past surgical history on file. Family History   Problem Relation Age of Onset    Diabetes Mother     Hypertension Father     Hypertension Brother         Social History     Tobacco Use    Smoking status: Never    Smokeless tobacco: Never   Substance Use Topics    Alcohol use: Yes        All systems reviewed x 12 and were negative with the exception of None      No flowsheet data found. Vitals: There were no vitals taken for this visit. There is no height or weight on file to calculate BMI. Physical Exam    General: NAD, well developed, well nourished. Cardiac: Extremities well perfused. Respiratory: Nonlabored breathing. RLE: No antalgic gait. Negative stinchfield. 0-100 degrees of flexion. >20 degrees internal rotation, >30 degrees external rotation. Negative ASHLYN. No pain with flexion adduction and internal rotation. .  Motor strength grossly intact. LLE: Positive antalgic gait. Positive stinchfield. 0-100 degrees of flexion. >20 degrees internal rotation, >30 degrees external rotation. Negative ASHLYN. Positive pain with flexion adduction and internal rotation. .  Motor strength grossly intact. Skin: Warm well perfused. Vascular: Palpable pedal pulses bilaterally. Equal. Capillary refill less than 2 seconds. Deepak Thomas M.D. was available for immediate consultation as the supervising physician. An electronic signature was used to authenticate this note.   -- Pavel Rivers PA-C

## 2023-05-19 ENCOUNTER — HOSPITAL ENCOUNTER (OUTPATIENT)
Facility: HOSPITAL | Age: 43
Discharge: HOME | End: 2023-05-22
Payer: COMMERCIAL

## 2023-05-19 ENCOUNTER — OFFICE VISIT (OUTPATIENT)
Age: 43
End: 2023-05-19
Payer: COMMERCIAL

## 2023-05-19 VITALS
WEIGHT: 168 LBS | BODY MASS INDEX: 26.37 KG/M2 | HEIGHT: 67 IN | OXYGEN SATURATION: 98 % | DIASTOLIC BLOOD PRESSURE: 88 MMHG | RESPIRATION RATE: 16 BRPM | SYSTOLIC BLOOD PRESSURE: 132 MMHG | HEART RATE: 88 BPM

## 2023-05-19 DIAGNOSIS — I10 ESSENTIAL (PRIMARY) HYPERTENSION: ICD-10-CM

## 2023-05-19 DIAGNOSIS — M25.552 CHRONIC HIP PAIN, LEFT: ICD-10-CM

## 2023-05-19 DIAGNOSIS — Z01.818 PRE-OP EXAM: ICD-10-CM

## 2023-05-19 DIAGNOSIS — Z01.818 PRE-OP EXAM: Primary | ICD-10-CM

## 2023-05-19 DIAGNOSIS — G89.29 CHRONIC HIP PAIN, LEFT: ICD-10-CM

## 2023-05-19 LAB
EKG ATRIAL RATE: 67 BPM
EKG DIAGNOSIS: NORMAL
EKG P AXIS: 66 DEGREES
EKG P-R INTERVAL: 132 MS
EKG Q-T INTERVAL: 416 MS
EKG QRS DURATION: 86 MS
EKG QTC CALCULATION (BAZETT): 439 MS
EKG R AXIS: 29 DEGREES
EKG T AXIS: 31 DEGREES
EKG VENTRICULAR RATE: 67 BPM

## 2023-05-19 PROCEDURE — 3075F SYST BP GE 130 - 139MM HG: CPT | Performed by: INTERNAL MEDICINE

## 2023-05-19 PROCEDURE — 99213 OFFICE O/P EST LOW 20 MIN: CPT | Performed by: INTERNAL MEDICINE

## 2023-05-19 PROCEDURE — 93005 ELECTROCARDIOGRAM TRACING: CPT

## 2023-05-19 PROCEDURE — 3078F DIAST BP <80 MM HG: CPT | Performed by: INTERNAL MEDICINE

## 2023-05-19 RX ORDER — LISINOPRIL 40 MG/1
40 TABLET ORAL DAILY
Qty: 90 TABLET | Refills: 1 | Status: SHIPPED | OUTPATIENT
Start: 2023-05-19

## 2023-05-19 SDOH — ECONOMIC STABILITY: HOUSING INSECURITY
IN THE LAST 12 MONTHS, WAS THERE A TIME WHEN YOU DID NOT HAVE A STEADY PLACE TO SLEEP OR SLEPT IN A SHELTER (INCLUDING NOW)?: NO

## 2023-05-19 SDOH — ECONOMIC STABILITY: FOOD INSECURITY: WITHIN THE PAST 12 MONTHS, YOU WORRIED THAT YOUR FOOD WOULD RUN OUT BEFORE YOU GOT MONEY TO BUY MORE.: NEVER TRUE

## 2023-05-19 SDOH — ECONOMIC STABILITY: INCOME INSECURITY: HOW HARD IS IT FOR YOU TO PAY FOR THE VERY BASICS LIKE FOOD, HOUSING, MEDICAL CARE, AND HEATING?: NOT HARD AT ALL

## 2023-05-19 SDOH — ECONOMIC STABILITY: FOOD INSECURITY: WITHIN THE PAST 12 MONTHS, THE FOOD YOU BOUGHT JUST DIDN'T LAST AND YOU DIDN'T HAVE MONEY TO GET MORE.: NEVER TRUE

## 2023-05-19 ASSESSMENT — PATIENT HEALTH QUESTIONNAIRE - PHQ9
SUM OF ALL RESPONSES TO PHQ QUESTIONS 1-9: 0
SUM OF ALL RESPONSES TO PHQ QUESTIONS 1-9: 0
SUM OF ALL RESPONSES TO PHQ9 QUESTIONS 1 & 2: 0
SUM OF ALL RESPONSES TO PHQ QUESTIONS 1-9: 0
1. LITTLE INTEREST OR PLEASURE IN DOING THINGS: 0
SUM OF ALL RESPONSES TO PHQ QUESTIONS 1-9: 0
2. FEELING DOWN, DEPRESSED OR HOPELESS: 0

## 2023-05-19 ASSESSMENT — ENCOUNTER SYMPTOMS
GASTROINTESTINAL NEGATIVE: 1
RESPIRATORY NEGATIVE: 1

## 2023-05-19 ASSESSMENT — ANXIETY QUESTIONNAIRES
7. FEELING AFRAID AS IF SOMETHING AWFUL MIGHT HAPPEN: 0
IF YOU CHECKED OFF ANY PROBLEMS ON THIS QUESTIONNAIRE, HOW DIFFICULT HAVE THESE PROBLEMS MADE IT FOR YOU TO DO YOUR WORK, TAKE CARE OF THINGS AT HOME, OR GET ALONG WITH OTHER PEOPLE: NOT DIFFICULT AT ALL
1. FEELING NERVOUS, ANXIOUS, OR ON EDGE: 0
5. BEING SO RESTLESS THAT IT IS HARD TO SIT STILL: 0
6. BECOMING EASILY ANNOYED OR IRRITABLE: 0
4. TROUBLE RELAXING: 0
3. WORRYING TOO MUCH ABOUT DIFFERENT THINGS: 0
2. NOT BEING ABLE TO STOP OR CONTROL WORRYING: 0
GAD7 TOTAL SCORE: 0

## 2023-05-19 NOTE — PROGRESS NOTES
Subjective    Penny Ledbetter is a 38 y.o. male who presents today for the following: patient was seen for a follow up. Is going to have left hip replacement at the beginning of June. Had the right hip done a few months ago. Dr. Sree Casey is going to do this. Had no problems with recovery for the last.   Blood work is needed and an EKG. BP is slightly elevated, but is usually ok at home. Does need this lisinopril at once a day for 40 mg daily for coverage. Allergies reviewed. No SE with anesthesia. Chief Complaint   Patient presents with    Pre-op Exam    Joint Swelling           PMH/PSH/Allergies/Social History/medication list and most recent studies reviewed with patient. reports that he has never smoked. He has never used smokeless tobacco.    reports current alcohol use. Vitals:    05/19/23 1619   BP: 132/88   Pulse:    Resp:    SpO2:       Past Medical History:   Diagnosis Date    Contact dermatitis and other eczema, due to unspecified cause        Allergies   Allergen Reactions    Penicillins      Other reaction(s): Unknown (comments)        Current Outpatient Medications on File Prior to Visit   Medication Sig Dispense Refill    colchicine (COLCRYS) 0.6 MG tablet Take by mouth daily       No current facility-administered medications on file prior to visit. Review of Systems   Constitutional: Negative. Respiratory: Negative. Cardiovascular: Negative. Gastrointestinal: Negative. Genitourinary: Negative. Musculoskeletal:         Hip apin   Neurological: Negative. Psychiatric/Behavioral: Negative. Objective    Physical Exam  Vitals and nursing note reviewed. Cardiovascular:      Rate and Rhythm: Normal rate and regular rhythm. Pulmonary:      Effort: Pulmonary effort is normal.      Breath sounds: Normal breath sounds. Abdominal:      General: Bowel sounds are normal.      Palpations: Abdomen is soft. Musculoskeletal:      Right hip: No crepitus.

## 2023-05-20 LAB
ALBUMIN SERPL-MCNC: 4.6 G/DL (ref 4–5)
ALBUMIN/GLOB SERPL: 1.6 {RATIO} (ref 1.2–2.2)
ALP SERPL-CCNC: 52 IU/L (ref 44–121)
ALT SERPL-CCNC: 23 IU/L (ref 0–44)
APPEARANCE UR: CLEAR
AST SERPL-CCNC: 19 IU/L (ref 0–40)
BACTERIA #/AREA URNS HPF: NORMAL /[HPF]
BASOPHILS # BLD AUTO: 0.1 X10E3/UL (ref 0–0.2)
BASOPHILS NFR BLD AUTO: 1 %
BILIRUB SERPL-MCNC: 0.3 MG/DL (ref 0–1.2)
BILIRUB UR QL STRIP: NEGATIVE
BUN SERPL-MCNC: 16 MG/DL (ref 6–24)
BUN/CREAT SERPL: 14 (ref 9–20)
CALCIUM SERPL-MCNC: 9.3 MG/DL (ref 8.7–10.2)
CASTS URNS QL MICRO: NORMAL /LPF
CHLORIDE SERPL-SCNC: 106 MMOL/L (ref 96–106)
CO2 SERPL-SCNC: 21 MMOL/L (ref 20–29)
COLOR UR: YELLOW
CREAT SERPL-MCNC: 1.16 MG/DL (ref 0.76–1.27)
EGFRCR SERPLBLD CKD-EPI 2021: 82 ML/MIN/1.73
EOSINOPHIL # BLD AUTO: 0.5 X10E3/UL (ref 0–0.4)
EOSINOPHIL NFR BLD AUTO: 8 %
EPI CELLS #/AREA URNS HPF: NORMAL /HPF (ref 0–10)
ERYTHROCYTE [DISTWIDTH] IN BLOOD BY AUTOMATED COUNT: 19.6 % (ref 11.6–15.4)
GLOBULIN SER CALC-MCNC: 2.8 G/DL (ref 1.5–4.5)
GLUCOSE SERPL-MCNC: 84 MG/DL (ref 70–99)
GLUCOSE UR QL STRIP: NEGATIVE
HBA1C MFR BLD: 5.5 % (ref 4.8–5.6)
HCT VFR BLD AUTO: 34.6 % (ref 37.5–51)
HGB BLD-MCNC: 9.7 G/DL (ref 13–17.7)
HGB UR QL STRIP: NEGATIVE
IMM GRANULOCYTES # BLD AUTO: 0 X10E3/UL (ref 0–0.1)
IMM GRANULOCYTES NFR BLD AUTO: 0 %
KETONES UR QL STRIP: NEGATIVE
LEUKOCYTE ESTERASE UR QL STRIP: NEGATIVE
LYMPHOCYTES # BLD AUTO: 1.9 X10E3/UL (ref 0.7–3.1)
LYMPHOCYTES NFR BLD AUTO: 30 %
MCH RBC QN AUTO: 18.9 PG (ref 26.6–33)
MCHC RBC AUTO-ENTMCNC: 28 G/DL (ref 31.5–35.7)
MCV RBC AUTO: 68 FL (ref 79–97)
MICRO URNS: ABNORMAL
MONOCYTES # BLD AUTO: 0.4 X10E3/UL (ref 0.1–0.9)
MONOCYTES NFR BLD AUTO: 6 %
NEUTROPHILS # BLD AUTO: 3.5 X10E3/UL (ref 1.4–7)
NEUTROPHILS NFR BLD AUTO: 55 %
NITRITE UR QL STRIP: NEGATIVE
PH UR STRIP: 5.5 [PH] (ref 5–7.5)
PLATELET # BLD AUTO: 262 X10E3/UL (ref 150–450)
POTASSIUM SERPL-SCNC: 4.3 MMOL/L (ref 3.5–5.2)
PROT SERPL-MCNC: 7.4 G/DL (ref 6–8.5)
PROT UR QL STRIP: ABNORMAL
RBC # BLD AUTO: 5.12 X10E6/UL (ref 4.14–5.8)
RBC #/AREA URNS HPF: NORMAL /HPF (ref 0–2)
SODIUM SERPL-SCNC: 142 MMOL/L (ref 134–144)
SP GR UR STRIP: 1.02 (ref 1–1.03)
UROBILINOGEN UR STRIP-MCNC: 0.2 MG/DL (ref 0.2–1)
WBC # BLD AUTO: 6.3 X10E3/UL (ref 3.4–10.8)
WBC #/AREA URNS HPF: NORMAL /HPF (ref 0–5)

## 2023-05-24 LAB — SPECIMEN STATUS REPORT: NORMAL

## 2023-05-25 ENCOUNTER — TELEPHONE (OUTPATIENT)
Age: 43
End: 2023-05-25

## 2023-05-25 DIAGNOSIS — E61.1 IRON DEFICIENCY: Primary | ICD-10-CM

## 2023-05-25 LAB
FERRITIN SERPL-MCNC: 8 NG/ML (ref 30–400)
IRON SATN MFR SERPL: 7 % (ref 15–55)
IRON SERPL-MCNC: 30 UG/DL (ref 38–169)
TIBC SERPL-MCNC: 434 UG/DL (ref 250–450)
UIBC SERPL-MCNC: 404 UG/DL (ref 111–343)

## 2023-05-25 RX ORDER — LANOLIN ALCOHOL/MO/W.PET/CERES
325 CREAM (GRAM) TOPICAL 2 TIMES DAILY
Qty: 90 TABLET | Refills: 3 | Status: SHIPPED | OUTPATIENT
Start: 2023-05-25

## 2023-05-25 NOTE — TELEPHONE ENCOUNTER
----- Message from [facility], APRN - NP sent at 5/25/2023  8:10 AM EDT -----  Patient needs to began taking iron BIB with orange juice to help with absorption.  Please lt surgeon know

## 2023-11-15 DIAGNOSIS — E61.1 IRON DEFICIENCY: ICD-10-CM

## 2023-11-15 DIAGNOSIS — I10 ESSENTIAL (PRIMARY) HYPERTENSION: ICD-10-CM

## 2023-11-15 RX ORDER — LANOLIN ALCOHOL/MO/W.PET/CERES
1 CREAM (GRAM) TOPICAL 2 TIMES DAILY
Qty: 180 TABLET | Refills: 1 | Status: SHIPPED | OUTPATIENT
Start: 2023-11-15

## 2023-11-15 RX ORDER — LISINOPRIL 40 MG/1
40 TABLET ORAL DAILY
Qty: 90 TABLET | Refills: 1 | Status: SHIPPED | OUTPATIENT
Start: 2023-11-15

## 2023-11-19 ENCOUNTER — PATIENT MESSAGE (OUTPATIENT)
Age: 43
End: 2023-11-19

## 2023-11-20 RX ORDER — COLCHICINE 0.6 MG/1
0.6 TABLET ORAL DAILY
Qty: 30 TABLET | Refills: 0 | Status: SHIPPED | OUTPATIENT
Start: 2023-11-20

## 2023-11-20 NOTE — TELEPHONE ENCOUNTER
05/19/2023  No upcoming appointment        From: Edilia Garcia  To: Todd Bacon  Sent: 11/19/2023  8:36 PM EST  Subject: Gout     Hi I am experiencing problems with my gout again.   Can I have a refill on the Colchicine Tablets, USP 0.6 mg

## 2024-01-10 ENCOUNTER — TELEPHONE (OUTPATIENT)
Age: 44
End: 2024-01-10

## 2024-01-10 DIAGNOSIS — Z87.39 HISTORY OF GOUT: ICD-10-CM

## 2024-01-10 RX ORDER — COLCHICINE 0.6 MG/1
0.6 TABLET ORAL DAILY
Qty: 90 TABLET | Refills: 0 | Status: SHIPPED | OUTPATIENT
Start: 2024-01-10

## 2024-03-01 ENCOUNTER — OFFICE VISIT (OUTPATIENT)
Age: 44
End: 2024-03-01

## 2024-03-01 VITALS
WEIGHT: 168 LBS | HEIGHT: 67 IN | OXYGEN SATURATION: 98 % | HEART RATE: 88 BPM | TEMPERATURE: 98 F | SYSTOLIC BLOOD PRESSURE: 138 MMHG | RESPIRATION RATE: 17 BRPM | DIASTOLIC BLOOD PRESSURE: 90 MMHG | BODY MASS INDEX: 26.37 KG/M2

## 2024-03-01 DIAGNOSIS — Z98.890: ICD-10-CM

## 2024-03-01 DIAGNOSIS — M25.429: Primary | ICD-10-CM

## 2024-03-01 DIAGNOSIS — I10 ESSENTIAL (PRIMARY) HYPERTENSION: ICD-10-CM

## 2024-03-01 ASSESSMENT — ENCOUNTER SYMPTOMS
CHEST TIGHTNESS: 0
GASTROINTESTINAL NEGATIVE: 1
SHORTNESS OF BREATH: 0
WHEEZING: 0
ALLERGIC/IMMUNOLOGIC NEGATIVE: 1
EYES NEGATIVE: 1

## 2024-03-01 ASSESSMENT — PATIENT HEALTH QUESTIONNAIRE - PHQ9
2. FEELING DOWN, DEPRESSED OR HOPELESS: 0
1. LITTLE INTEREST OR PLEASURE IN DOING THINGS: 0
SUM OF ALL RESPONSES TO PHQ QUESTIONS 1-9: 0
SUM OF ALL RESPONSES TO PHQ9 QUESTIONS 1 & 2: 0
SUM OF ALL RESPONSES TO PHQ QUESTIONS 1-9: 0

## 2024-03-01 NOTE — PROGRESS NOTES
\"Have you been to the ER, urgent care clinic since your last visit?  Hospitalized since your last visit?\"    NO    “Have you seen or consulted any other health care providers outside of [facility] since your last visit?”    NO

## 2024-03-01 NOTE — PROGRESS NOTES
Subjective    Rajiv Clark Jr is a 43 y.o. male who presents today for the following:  Rajiv presents today for a follow-up for his elbow swelling. Previously came in for the same problem to the right elbow for which he was prescribed steroids which he states were completed with no effect. Now he has swelling to his left elbow as well. Denies trauma, endorses some pain to his right bicep but attributes this to work. Swelling does not limit his range of motion or impact his daily activities but it is bothersome to him. States swelling does not spread to the rest of his arm, nor does it decrease with elevation or other interventions.     Chief Complaint   Patient presents with    Follow-up Chronic Condition    Hypertension    Gout    Joint Swelling           PMH/PSH/Allergies/Social History/medication list and most recent studies reviewed with patient.     reports that he has never smoked. He has never used smokeless tobacco.    reports current alcohol use.     Vitals:    03/01/24 1553   BP: (!) 138/90   Pulse:    Resp:    Temp:    SpO2:        Past Medical History:   Diagnosis Date    Contact dermatitis and other eczema, due to unspecified cause        Allergies   Allergen Reactions    Penicillins      Other reaction(s): Unknown (comments)        Current Outpatient Medications on File Prior to Visit   Medication Sig Dispense Refill    colchicine (COLCRYS) 0.6 MG tablet Take 1 tablet by mouth daily 90 tablet 0    ferrous sulfate (FE TABS 325) 325 (65 Fe) MG EC tablet TAKE 1 TABLET BY MOUTH TWICE A  tablet 1    lisinopril (PRINIVIL;ZESTRIL) 40 MG tablet TAKE 1 TABLET BY MOUTH EVERY DAY 90 tablet 1    meloxicam (MOBIC) 7.5 MG tablet Take 1 tablet by mouth daily 30 tablet 1     No current facility-administered medications on file prior to visit.           Review of Systems   Constitutional:  Negative for chills, fatigue, fever and unexpected weight change.   HENT: Negative.     Eyes: Negative.    Respiratory:

## 2024-03-13 ENCOUNTER — OFFICE VISIT (OUTPATIENT)
Age: 44
End: 2024-03-13
Payer: COMMERCIAL

## 2024-03-13 VITALS
HEIGHT: 67 IN | HEART RATE: 91 BPM | WEIGHT: 171.8 LBS | SYSTOLIC BLOOD PRESSURE: 132 MMHG | TEMPERATURE: 98 F | OXYGEN SATURATION: 99 % | RESPIRATION RATE: 16 BRPM | DIASTOLIC BLOOD PRESSURE: 88 MMHG | BODY MASS INDEX: 26.97 KG/M2

## 2024-03-13 DIAGNOSIS — M70.22 OLECRANON BURSITIS OF LEFT ELBOW: Primary | ICD-10-CM

## 2024-03-13 PROCEDURE — 99213 OFFICE O/P EST LOW 20 MIN: CPT | Performed by: INTERNAL MEDICINE

## 2024-03-13 RX ORDER — CEPHALEXIN 500 MG/1
500 CAPSULE ORAL 4 TIMES DAILY
Qty: 40 CAPSULE | Refills: 0 | Status: SHIPPED | OUTPATIENT
Start: 2024-03-13 | End: 2024-03-23

## 2024-03-13 ASSESSMENT — PATIENT HEALTH QUESTIONNAIRE - PHQ9
SUM OF ALL RESPONSES TO PHQ QUESTIONS 1-9: 0
DEPRESSION UNABLE TO ASSESS: PT REFUSES
1. LITTLE INTEREST OR PLEASURE IN DOING THINGS: 0
2. FEELING DOWN, DEPRESSED OR HOPELESS: 0
SUM OF ALL RESPONSES TO PHQ9 QUESTIONS 1 & 2: 0
SUM OF ALL RESPONSES TO PHQ QUESTIONS 1-9: 0

## 2024-03-13 ASSESSMENT — ANXIETY QUESTIONNAIRES
3. WORRYING TOO MUCH ABOUT DIFFERENT THINGS: 0
1. FEELING NERVOUS, ANXIOUS, OR ON EDGE: 0
IF YOU CHECKED OFF ANY PROBLEMS ON THIS QUESTIONNAIRE, HOW DIFFICULT HAVE THESE PROBLEMS MADE IT FOR YOU TO DO YOUR WORK, TAKE CARE OF THINGS AT HOME, OR GET ALONG WITH OTHER PEOPLE: NOT DIFFICULT AT ALL
6. BECOMING EASILY ANNOYED OR IRRITABLE: 0
2. NOT BEING ABLE TO STOP OR CONTROL WORRYING: 0
7. FEELING AFRAID AS IF SOMETHING AWFUL MIGHT HAPPEN: 0
GAD7 TOTAL SCORE: 0
5. BEING SO RESTLESS THAT IT IS HARD TO SIT STILL: 0
4. TROUBLE RELAXING: 0

## 2024-03-13 ASSESSMENT — ENCOUNTER SYMPTOMS: RESPIRATORY NEGATIVE: 1

## 2024-03-13 NOTE — PROGRESS NOTES
Subjective    Rajiv Clark Jr is a 43 y.o. male who presents today for the following: patient was seen for a follow up.     Was seen in the office about a week ago for effusion of the elbows. I removed fluid from both areas.     Patient reports that in the last few days the left elbow began to get red and tender. No fever. Able to have ROM to the joint. No injury to the area.     Fluid at the time of aspiration appeared free from infection.   Chief Complaint   Patient presents with    Elbow Pain     Left     Follow-up           PMH/PSH/Allergies/Social History/medication list and most recent studies reviewed with patient.     reports that he has never smoked. He has never used smokeless tobacco.    reports current alcohol use.     Vitals:    03/13/24 1443   BP: 132/88   Pulse:    Resp:    Temp:    SpO2:      Body mass index is 26.91 kg/m².      3/13/2024     1:43 PM 3/1/2024     2:01 PM 6/30/2023    11:01 AM 5/19/2023     1:49 PM 12/16/2022     3:24 PM 11/21/2022     9:04 AM 7/1/2022    11:59 AM   Weight Metrics   Weight 171 lb 12.8 oz 168 lb 168 lb 168 lb 176 lb 1.6 oz 171 lb 3.2 oz 168 lb   BMI (Calculated) 27 kg/m2 26.4 kg/m2 26.4 kg/m2 26.4 kg/m2 27.6 kg/m2 26.9 kg/m2 26.4 kg/m2       Past Medical History:   Diagnosis Date    Contact dermatitis and other eczema, due to unspecified cause        Allergies   Allergen Reactions    Penicillins      Other reaction(s): Unknown (comments)        Current Outpatient Medications on File Prior to Visit   Medication Sig Dispense Refill    colchicine (COLCRYS) 0.6 MG tablet Take 1 tablet by mouth daily 90 tablet 0    ferrous sulfate (FE TABS 325) 325 (65 Fe) MG EC tablet TAKE 1 TABLET BY MOUTH TWICE A  tablet 1    lisinopril (PRINIVIL;ZESTRIL) 40 MG tablet TAKE 1 TABLET BY MOUTH EVERY DAY 90 tablet 1    meloxicam (MOBIC) 7.5 MG tablet Take 1 tablet by mouth daily 30 tablet 1     No current facility-administered medications on file prior to visit.           Review of

## 2024-03-13 NOTE — PROGRESS NOTES
Chief Complaint   Patient presents with    Elbow Pain     Left     Follow-up       \"Have you been to the ER, urgent care clinic since your last visit?  Hospitalized since your last visit?\"    NO    “Have you seen or consulted any other health care providers outside of [facility] since your last visit?”    NO            Click Here for Release of Records Request

## 2024-04-17 DIAGNOSIS — Z87.39 HISTORY OF GOUT: ICD-10-CM

## 2024-04-17 RX ORDER — COLCHICINE 0.6 MG/1
0.6 TABLET ORAL DAILY
Qty: 90 TABLET | Refills: 0 | Status: SHIPPED | OUTPATIENT
Start: 2024-04-17

## 2024-05-15 DIAGNOSIS — E61.1 IRON DEFICIENCY: ICD-10-CM

## 2024-05-15 DIAGNOSIS — I10 ESSENTIAL (PRIMARY) HYPERTENSION: ICD-10-CM

## 2024-05-15 RX ORDER — UREA 10 %
1 LOTION (ML) TOPICAL 2 TIMES DAILY
Qty: 180 TABLET | Refills: 0 | Status: SHIPPED | OUTPATIENT
Start: 2024-05-15

## 2024-05-15 RX ORDER — LISINOPRIL 40 MG/1
40 TABLET ORAL DAILY
Qty: 90 TABLET | Refills: 0 | Status: SHIPPED | OUTPATIENT
Start: 2024-05-15

## 2024-07-25 DIAGNOSIS — Z87.39 HISTORY OF GOUT: ICD-10-CM

## 2024-07-25 RX ORDER — COLCHICINE 0.6 MG/1
0.6 TABLET ORAL DAILY
Qty: 90 TABLET | Refills: 0 | Status: SHIPPED | OUTPATIENT
Start: 2024-07-25

## 2024-08-10 DIAGNOSIS — I10 ESSENTIAL (PRIMARY) HYPERTENSION: ICD-10-CM

## 2024-08-12 DIAGNOSIS — E61.1 IRON DEFICIENCY: ICD-10-CM

## 2024-08-12 RX ORDER — FERROUS SULFATE 325(65) MG
1 TABLET, DELAYED RELEASE (ENTERIC COATED) ORAL 2 TIMES DAILY
Qty: 180 TABLET | Refills: 0 | Status: SHIPPED | OUTPATIENT
Start: 2024-08-12

## 2024-08-12 RX ORDER — LISINOPRIL 40 MG/1
40 TABLET ORAL DAILY
Qty: 90 TABLET | Refills: 0 | Status: SHIPPED | OUTPATIENT
Start: 2024-08-12

## 2024-08-12 NOTE — TELEPHONE ENCOUNTER
[id number] - [place] - [address] - P [phone]- F [phone]     ferrous sulfate (FE TABS 325) 325 (65 Fe) MG EC tablet       03/13/2024  No upcoming

## 2024-10-23 DIAGNOSIS — Z87.39 HISTORY OF GOUT: ICD-10-CM

## 2024-10-23 RX ORDER — COLCHICINE 0.6 MG/1
0.6 TABLET ORAL DAILY
Qty: 30 TABLET | Refills: 0 | Status: SHIPPED | OUTPATIENT
Start: 2024-10-23

## 2024-10-25 NOTE — TELEPHONE ENCOUNTER
Rajiv Clark Jr was called and verbalized understanding on note below.     Future Appointments   Date Time Provider Department Center   11/25/2024  3:15 PM Daisy Bacon APRN - NP [facility] DE[facility]

## 2024-11-09 DIAGNOSIS — E61.1 IRON DEFICIENCY: ICD-10-CM

## 2024-11-09 DIAGNOSIS — I10 ESSENTIAL (PRIMARY) HYPERTENSION: ICD-10-CM

## 2024-11-12 RX ORDER — LISINOPRIL 40 MG/1
40 TABLET ORAL DAILY
Qty: 30 TABLET | Refills: 0 | Status: SHIPPED | OUTPATIENT
Start: 2024-11-12

## 2024-11-12 RX ORDER — FERROUS SULFATE 325(65) MG
1 TABLET, DELAYED RELEASE (ENTERIC COATED) ORAL 2 TIMES DAILY
Qty: 60 TABLET | Refills: 0 | Status: SHIPPED | OUTPATIENT
Start: 2024-11-12

## 2024-11-26 DIAGNOSIS — Z87.39 HISTORY OF GOUT: ICD-10-CM

## 2024-11-27 RX ORDER — COLCHICINE 0.6 MG/1
0.6 TABLET ORAL DAILY
Qty: 90 TABLET | Refills: 1 | OUTPATIENT
Start: 2024-11-27

## 2024-11-27 NOTE — TELEPHONE ENCOUNTER
Has appt on Friday.    See further documentation in 8/6/24 nurse triage encounter.     Alena Giordano RN

## 2024-11-29 ENCOUNTER — OFFICE VISIT (OUTPATIENT)
Age: 44
End: 2024-11-29
Payer: COMMERCIAL

## 2024-11-29 VITALS
DIASTOLIC BLOOD PRESSURE: 82 MMHG | HEIGHT: 67 IN | HEART RATE: 79 BPM | SYSTOLIC BLOOD PRESSURE: 124 MMHG | OXYGEN SATURATION: 98 % | BODY MASS INDEX: 25.27 KG/M2 | RESPIRATION RATE: 18 BRPM | WEIGHT: 161 LBS

## 2024-11-29 DIAGNOSIS — Z13.220 LIPID SCREENING: ICD-10-CM

## 2024-11-29 DIAGNOSIS — I10 ESSENTIAL (PRIMARY) HYPERTENSION: ICD-10-CM

## 2024-11-29 DIAGNOSIS — M70.32 BURSITIS OF BOTH ELBOWS, UNSPECIFIED BURSA: Primary | ICD-10-CM

## 2024-11-29 DIAGNOSIS — Z87.39 HISTORY OF GOUT: ICD-10-CM

## 2024-11-29 DIAGNOSIS — M70.31 BURSITIS OF BOTH ELBOWS, UNSPECIFIED BURSA: Primary | ICD-10-CM

## 2024-11-29 DIAGNOSIS — E55.9 VITAMIN D DEFICIENCY: ICD-10-CM

## 2024-11-29 DIAGNOSIS — E61.1 IRON DEFICIENCY: ICD-10-CM

## 2024-11-29 PROCEDURE — 99214 OFFICE O/P EST MOD 30 MIN: CPT | Performed by: INTERNAL MEDICINE

## 2024-11-29 PROCEDURE — 3074F SYST BP LT 130 MM HG: CPT | Performed by: INTERNAL MEDICINE

## 2024-11-29 PROCEDURE — 3079F DIAST BP 80-89 MM HG: CPT | Performed by: INTERNAL MEDICINE

## 2024-11-29 RX ORDER — LISINOPRIL 40 MG/1
40 TABLET ORAL DAILY
Qty: 90 TABLET | Refills: 1 | Status: SHIPPED | OUTPATIENT
Start: 2024-11-29

## 2024-11-29 RX ORDER — FERROUS SULFATE 325(65) MG
1 TABLET, DELAYED RELEASE (ENTERIC COATED) ORAL 2 TIMES DAILY
Qty: 180 TABLET | Refills: 1 | Status: SHIPPED | OUTPATIENT
Start: 2024-11-29

## 2024-11-29 RX ORDER — COLCHICINE 0.6 MG/1
0.6 TABLET ORAL DAILY
Qty: 90 TABLET | Refills: 1 | Status: SHIPPED | OUTPATIENT
Start: 2024-11-29

## 2024-11-29 SDOH — ECONOMIC STABILITY: FOOD INSECURITY: WITHIN THE PAST 12 MONTHS, YOU WORRIED THAT YOUR FOOD WOULD RUN OUT BEFORE YOU GOT MONEY TO BUY MORE.: NEVER TRUE

## 2024-11-29 SDOH — ECONOMIC STABILITY: INCOME INSECURITY: HOW HARD IS IT FOR YOU TO PAY FOR THE VERY BASICS LIKE FOOD, HOUSING, MEDICAL CARE, AND HEATING?: NOT HARD AT ALL

## 2024-11-29 SDOH — ECONOMIC STABILITY: FOOD INSECURITY: WITHIN THE PAST 12 MONTHS, THE FOOD YOU BOUGHT JUST DIDN'T LAST AND YOU DIDN'T HAVE MONEY TO GET MORE.: NEVER TRUE

## 2024-11-29 ASSESSMENT — ENCOUNTER SYMPTOMS
RESPIRATORY NEGATIVE: 1
GASTROINTESTINAL NEGATIVE: 1

## 2024-11-29 NOTE — PROGRESS NOTES
Chief Complaint   Patient presents with    Hypertension    Gout    Medication Refill       \"Have you been to the ER, urgent care clinic since your last visit?  Hospitalized since your last visit?\"    NO    “Have you seen or consulted any other health care providers outside our system since your last visit?”    NO

## 2024-11-29 NOTE — PROGRESS NOTES
Subjective    Rajiv Clark Jr is a 44 y.o. male who presents today for the following:  Chief Complaint   Patient presents with    Hypertension    Gout    Medication Refill       History of Present Illness  The patient presents for a follow-up.    He reports no current issues with his hip and is not on any medication for it.    He has been experiencing problems with his elbow, which has required fluid removal on several occasions. He sought orthopedic consultation for this issue, during which some fluid was removed and he was prescribed antibiotics. He experiences pain in his elbow when pressure is applied, rating it as 6 out of 10. He does not take any medication for this pain.    His current medications include colchicine, iron, and lisinopril 40 mg. He does not regularly monitor his blood pressure at home, but did take his blood pressure medication this morning. He reports no headaches or dizziness.    He also reports no recent gout-related flare-ups, although he did experience some foot soreness about a month ago. He has not had any recent lab work done.        PMH/PSH/Allergies/Social History/medication list and most recent studies reviewed with patient.     reports that he has never smoked. He has never used smokeless tobacco.    reports current alcohol use.   Results       Vitals:    11/29/24 1129   BP: 124/82   Pulse:    Resp:    SpO2:      Body mass index is 25.22 kg/m².      11/29/2024    11:11 AM 5/17/2024    12:27 PM 3/27/2024    10:36 AM 3/19/2024    11:56 AM 3/13/2024     1:43 PM 3/1/2024     2:01 PM 6/30/2023    11:01 AM   Weight Metrics   Weight 161 lb 171 lb 171 lb 11.8 oz 171 lb 11.8 oz 171 lb 12.8 oz 168 lb 168 lb   BMI (Calculated) 25.3 kg/m2 26.8 kg/m2 27 kg/m2 27 kg/m2 27 kg/m2 26.4 kg/m2 26.4 kg/m2       Past Medical History:   Diagnosis Date    Contact dermatitis and other eczema, due to unspecified cause        Allergies   Allergen Reactions    Penicillins      Other reaction(s): Unknown

## 2025-01-16 LAB
25(OH)D3+25(OH)D2 SERPL-MCNC: 13 NG/ML (ref 30–100)
ALBUMIN SERPL-MCNC: 4.5 G/DL (ref 4.1–5.1)
ALP SERPL-CCNC: 47 IU/L (ref 44–121)
ALT SERPL-CCNC: 30 IU/L (ref 0–44)
AST SERPL-CCNC: 22 IU/L (ref 0–40)
BASOPHILS # BLD AUTO: 0.1 X10E3/UL (ref 0–0.2)
BASOPHILS NFR BLD AUTO: 1 %
BILIRUB SERPL-MCNC: 0.3 MG/DL (ref 0–1.2)
BUN SERPL-MCNC: 15 MG/DL (ref 6–24)
BUN/CREAT SERPL: 12 (ref 9–20)
CALCIUM SERPL-MCNC: 9.6 MG/DL (ref 8.7–10.2)
CHLORIDE SERPL-SCNC: 106 MMOL/L (ref 96–106)
CHOLEST SERPL-MCNC: 213 MG/DL (ref 100–199)
CO2 SERPL-SCNC: 23 MMOL/L (ref 20–29)
CREAT SERPL-MCNC: 1.3 MG/DL (ref 0.76–1.27)
EGFRCR SERPLBLD CKD-EPI 2021: 70 ML/MIN/1.73
EOSINOPHIL # BLD AUTO: 0.5 X10E3/UL (ref 0–0.4)
EOSINOPHIL NFR BLD AUTO: 6 %
ERYTHROCYTE [DISTWIDTH] IN BLOOD BY AUTOMATED COUNT: 16.2 % (ref 11.6–15.4)
GLOBULIN SER CALC-MCNC: 2.9 G/DL (ref 1.5–4.5)
GLUCOSE SERPL-MCNC: 76 MG/DL (ref 70–99)
HCT VFR BLD AUTO: 39 % (ref 37.5–51)
HDLC SERPL-MCNC: 46 MG/DL
HGB BLD-MCNC: 11.8 G/DL (ref 13–17.7)
IMM GRANULOCYTES # BLD AUTO: 0 X10E3/UL (ref 0–0.1)
IMM GRANULOCYTES NFR BLD AUTO: 0 %
IMP & REVIEW OF LAB RESULTS: NORMAL
LDLC SERPL CALC-MCNC: 139 MG/DL (ref 0–99)
LYMPHOCYTES # BLD AUTO: 2.4 X10E3/UL (ref 0.7–3.1)
LYMPHOCYTES NFR BLD AUTO: 31 %
MCH RBC QN AUTO: 24.6 PG (ref 26.6–33)
MCHC RBC AUTO-ENTMCNC: 30.3 G/DL (ref 31.5–35.7)
MCV RBC AUTO: 81 FL (ref 79–97)
MONOCYTES # BLD AUTO: 0.6 X10E3/UL (ref 0.1–0.9)
MONOCYTES NFR BLD AUTO: 8 %
NEUTROPHILS # BLD AUTO: 4.1 X10E3/UL (ref 1.4–7)
NEUTROPHILS NFR BLD AUTO: 54 %
PLATELET # BLD AUTO: 340 X10E3/UL (ref 150–450)
POTASSIUM SERPL-SCNC: 4.6 MMOL/L (ref 3.5–5.2)
PROT SERPL-MCNC: 7.4 G/DL (ref 6–8.5)
RBC # BLD AUTO: 4.8 X10E6/UL (ref 4.14–5.8)
SODIUM SERPL-SCNC: 142 MMOL/L (ref 134–144)
TRIGL SERPL-MCNC: 157 MG/DL (ref 0–149)
URATE SERPL-MCNC: 9.4 MG/DL (ref 3.8–8.4)
VLDLC SERPL CALC-MCNC: 28 MG/DL (ref 5–40)
WBC # BLD AUTO: 7.5 X10E3/UL (ref 3.4–10.8)

## 2025-01-17 ENCOUNTER — TELEPHONE (OUTPATIENT)
Age: 45
End: 2025-01-17

## 2025-01-17 DIAGNOSIS — E55.9 VITAMIN D DEFICIENCY: Primary | ICD-10-CM

## 2025-01-17 NOTE — TELEPHONE ENCOUNTER
Spoke with patient after verifying name and . Notified patient of lab results and recommendation from provider. Patient verbalized understanding and given a chance to ask questions.      ----- Message from OLLIE Salazar NP sent at 2025  9:02 AM EST -----  Vitamin d low, send in [id number] weekly for 12 weeks and then take OTC daily after  CBC is better  Cholesterol and LDL are elevated. Please be sure her works on a mediterranean like diet   Cr is elevated. Please increase water intake   Uric is elevated.. if he having a gout flare at this time? Please be sure he is taking his colchiceine 0.6 mg BID for 7 days not daily     To HK, any advisement or would you like pt to f/u w/ PCP regarding PE?

## 2025-01-18 RX ORDER — ERGOCALCIFEROL 1.25 MG/1
50000 CAPSULE, LIQUID FILLED ORAL WEEKLY
Qty: 12 CAPSULE | Refills: 1 | Status: SHIPPED | OUTPATIENT
Start: 2025-01-18

## 2025-02-19 ENCOUNTER — PATIENT MESSAGE (OUTPATIENT)
Age: 45
End: 2025-02-19

## 2025-06-02 ENCOUNTER — OFFICE VISIT (OUTPATIENT)
Age: 45
End: 2025-06-02
Payer: COMMERCIAL

## 2025-06-02 VITALS
RESPIRATION RATE: 18 BRPM | DIASTOLIC BLOOD PRESSURE: 80 MMHG | HEART RATE: 71 BPM | BODY MASS INDEX: 25.74 KG/M2 | SYSTOLIC BLOOD PRESSURE: 152 MMHG | OXYGEN SATURATION: 98 % | WEIGHT: 164 LBS | HEIGHT: 67 IN

## 2025-06-02 DIAGNOSIS — M54.2 NECK PAIN: ICD-10-CM
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
## 2025-06-02 DIAGNOSIS — N28.9 LESION OF RIGHT NATIVE KIDNEY: ICD-10-CM

## 2025-06-02 DIAGNOSIS — R79.89 ELEVATED SERUM CREATININE: ICD-10-CM

## 2025-06-02 DIAGNOSIS — I10 ESSENTIAL (PRIMARY) HYPERTENSION: ICD-10-CM

## 2025-06-02 DIAGNOSIS — I10 ESSENTIAL (PRIMARY) HYPERTENSION: Primary | ICD-10-CM

## 2025-06-02 PROCEDURE — 99214 OFFICE O/P EST MOD 30 MIN: CPT | Performed by: INTERNAL MEDICINE

## 2025-06-02 PROCEDURE — 3079F DIAST BP 80-89 MM HG: CPT | Performed by: INTERNAL MEDICINE

## 2025-06-02 PROCEDURE — 3077F SYST BP >= 140 MM HG: CPT | Performed by: INTERNAL MEDICINE

## 2025-06-02 RX ORDER — METHOCARBAMOL 750 MG/1
TABLET, FILM COATED ORAL
COMMUNITY
Start: 2025-05-30

## 2025-06-02 RX ORDER — OXYCODONE AND ACETAMINOPHEN 5; 325 MG/1; MG/1
1 TABLET ORAL EVERY 6 HOURS PRN
COMMUNITY
Start: 2025-05-29

## 2025-06-02 SDOH — ECONOMIC STABILITY: FOOD INSECURITY: WITHIN THE PAST 12 MONTHS, THE FOOD YOU BOUGHT JUST DIDN'T LAST AND YOU DIDN'T HAVE MONEY TO GET MORE.: NEVER TRUE

## 2025-06-02 SDOH — ECONOMIC STABILITY: FOOD INSECURITY: WITHIN THE PAST 12 MONTHS, YOU WORRIED THAT YOUR FOOD WOULD RUN OUT BEFORE YOU GOT MONEY TO BUY MORE.: NEVER TRUE

## 2025-06-02 ASSESSMENT — PATIENT HEALTH QUESTIONNAIRE - PHQ9
SUM OF ALL RESPONSES TO PHQ QUESTIONS 1-9: 0
1. LITTLE INTEREST OR PLEASURE IN DOING THINGS: NOT AT ALL
2. FEELING DOWN, DEPRESSED OR HOPELESS: NOT AT ALL

## 2025-06-02 ASSESSMENT — ENCOUNTER SYMPTOMS: RESPIRATORY NEGATIVE: 1
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
## 2025-06-10 ENCOUNTER — HOSPITAL ENCOUNTER (OUTPATIENT)
Facility: HOSPITAL | Age: 45
Setting detail: RECURRING SERIES
Discharge: HOME | End: 2025-06-13
Payer: COMMERCIAL

## 2025-06-10 DIAGNOSIS — I10 ESSENTIAL (PRIMARY) HYPERTENSION: ICD-10-CM

## 2025-06-10 DIAGNOSIS — E61.1 IRON DEFICIENCY: ICD-10-CM

## 2025-06-10 PROCEDURE — 97110 THERAPEUTIC EXERCISES: CPT

## 2025-06-10 PROCEDURE — 97161 PT EVAL LOW COMPLEX 20 MIN: CPT

## 2025-06-10 RX ORDER — FERROUS SULFATE 325(65) MG
1 TABLET, DELAYED RELEASE (ENTERIC COATED) ORAL 2 TIMES DAILY
Qty: 180 TABLET | Refills: 1 | Status: SHIPPED | OUTPATIENT
Start: 2025-06-10

## 2025-06-10 RX ORDER — LISINOPRIL 40 MG/1
40 TABLET ORAL DAILY
Qty: 90 TABLET | Refills: 1 | Status: SHIPPED | OUTPATIENT
Start: 2025-06-10
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
## 2025-06-10 NOTE — TELEPHONE ENCOUNTER
Last appt 6/2/2025      Next Apt:     Future Appointments   Date Time Provider Department Center   6/10/2025  1:00 PM Renae Trujillo, PT [facility] [place] [address] [facility] [place] - [address] - P [phone] - F [phone]  [address] [place] [address]  Phone: [phone] Fax: [phone]

## 2025-06-13 ENCOUNTER — HOSPITAL ENCOUNTER (OUTPATIENT)
Facility: HOSPITAL | Age: 45
Setting detail: RECURRING SERIES
Discharge: HOME | End: 2025-06-16
Payer: COMMERCIAL

## 2025-06-13 PROCEDURE — 97110 THERAPEUTIC EXERCISES: CPT | Performed by: PHYSICAL THERAPIST

## 2025-06-13 PROCEDURE — 97112 NEUROMUSCULAR REEDUCATION: CPT | Performed by: PHYSICAL THERAPIST
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
## 2025-06-18 ENCOUNTER — APPOINTMENT (OUTPATIENT)
Facility: HOSPITAL | Age: 45
End: 2025-06-18
Payer: COMMERCIAL

## 2025-06-20 ENCOUNTER — HOSPITAL ENCOUNTER (OUTPATIENT)
Facility: HOSPITAL | Age: 45
Setting detail: RECURRING SERIES
Discharge: HOME | End: 2025-06-23
Payer: COMMERCIAL

## 2025-06-20 DIAGNOSIS — Z87.39 HISTORY OF GOUT: ICD-10-CM

## 2025-06-20 PROCEDURE — 97112 NEUROMUSCULAR REEDUCATION: CPT | Performed by: PHYSICAL THERAPIST

## 2025-06-20 NOTE — PROGRESS NOTES
rehab   18 10 [id number] Therapeutic Exercise (timed):  increase ROM, strength, coordination, balance, and proprioception to improve patient's ability to progress to PLOF and address remaining functional goals. (see flow sheet as applicable)     Details if applicable:  see flow sheet   48 40    Total Total         [x]  Patient Education billed concurrently with other procedures   [x] Review HEP    [] Progressed/Changed HEP, detail:    [x] Other detail:   HEP updated to include VOR      Other Objective/Functional Measures            Pain Level at end of session (0-10 scale): 0/10 neck pain, 1/10 fogginess      Assessment   Pt progressing well with concussion and neck PT. Denied any neck pain following treatment today.  Patient will continue to benefit from skilled PT / OT services to modify and progress therapeutic interventions, analyze and address functional mobility deficits, analyze and address ROM deficits, analyze and address strength deficits, analyze and address soft tissue restrictions, analyze and cue for proper movement patterns, analyze and modify for postural abnormalities, analyze and address imbalance/dizziness, and instruct in home and community integration to address functional deficits and attain remaining goals.    Progress toward goals / Updated goals:  []  See Progress Note/Recertification    NT      PLAN  Yes  Continue plan of care  Re-Cert Due: NT  []  Upgrade activities as tolerated  []  Discharge due to:  []  Other:      Xochitl Zuluaga, PT       6/20/2025       9:56 AM

## 2025-06-23 RX ORDER — COLCHICINE 0.6 MG/1
0.6 TABLET ORAL DAILY
Qty: 90 TABLET | Refills: 1 | Status: SHIPPED | OUTPATIENT
Start: 2025-06-23

## 2025-06-24 ENCOUNTER — HOSPITAL ENCOUNTER (OUTPATIENT)
Facility: HOSPITAL | Age: 45
Setting detail: RECURRING SERIES
Discharge: HOME | End: 2025-06-27
Payer: COMMERCIAL

## 2025-06-24 PROCEDURE — 97112 NEUROMUSCULAR REEDUCATION: CPT | Performed by: PHYSICAL THERAPIST

## 2025-06-24 PROCEDURE — 97110 THERAPEUTIC EXERCISES: CPT | Performed by: PHYSICAL THERAPIST

## 2025-06-24 NOTE — PROGRESS NOTES
applicable:  concussion rehab   15  [id number] Therapeutic Exercise (timed):  increase ROM, strength, coordination, balance, and proprioception to improve patient's ability to progress to PLOF and address remaining functional goals. (see flow sheet as applicable)     Details if applicable:  see flow sheet   35     Total Total         [x]  Patient Education billed concurrently with other procedures   [x] Review HEP    [] Progressed/Changed HEP, detail:    [x] Other detail:   HEP updated to include VOR      Other Objective/Functional Measures            Pain Level at end of session (0-10 scale): 0/10 neck pain, 0/10       Assessment   Pt progressing well without symptoms throughout treatment today. Pt HEP updated to include rows and extension.   Patient will continue to benefit from skilled PT / OT services to modify and progress therapeutic interventions, analyze and address functional mobility deficits, analyze and address ROM deficits, analyze and address strength deficits, analyze and address soft tissue restrictions, analyze and cue for proper movement patterns, analyze and modify for postural abnormalities, analyze and address imbalance/dizziness, and instruct in home and community integration to address functional deficits and attain remaining goals.    Progress toward goals / Updated goals:  []  See Progress Note/Recertification    NT      PLAN  Yes  Continue plan of care  Re-Cert Due: NT  []  Upgrade activities as tolerated  []  Discharge due to:  [x]  Other: Pt to perform HEP on own and return as needed over next month      Xochitl Zuluaga, PT       6/24/2025       2:12 PM

## 2025-07-10 DIAGNOSIS — E55.9 VITAMIN D DEFICIENCY: ICD-10-CM
